# Patient Record
Sex: FEMALE | Race: WHITE | Employment: UNEMPLOYED | URBAN - NONMETROPOLITAN AREA
[De-identification: names, ages, dates, MRNs, and addresses within clinical notes are randomized per-mention and may not be internally consistent; named-entity substitution may affect disease eponyms.]

---

## 2022-04-05 ENCOUNTER — HOSPITAL ENCOUNTER (OUTPATIENT)
Dept: ULTRASOUND IMAGING | Age: 52
Discharge: HOME | End: 2022-04-05
Payer: COMMERCIAL

## 2022-04-05 DIAGNOSIS — R10.2 PELVIC PAIN IN FEMALE: ICD-10-CM

## 2022-04-05 DIAGNOSIS — N89.8 VAGINAL DISCHARGE: ICD-10-CM

## 2022-04-05 PROCEDURE — 76856 US EXAM PELVIC COMPLETE: CPT

## 2022-04-05 PROCEDURE — 93975 VASCULAR STUDY: CPT

## 2022-04-05 PROCEDURE — 76830 TRANSVAGINAL US NON-OB: CPT

## 2023-07-30 ENCOUNTER — HOSPITAL ENCOUNTER (EMERGENCY)
Age: 53
Discharge: HOME | End: 2023-07-30
Attending: EMERGENCY MEDICINE
Payer: COMMERCIAL

## 2023-07-30 ENCOUNTER — APPOINTMENT (OUTPATIENT)
Dept: CT IMAGING | Age: 53
End: 2023-07-30
Payer: COMMERCIAL

## 2023-07-30 ENCOUNTER — APPOINTMENT (OUTPATIENT)
Dept: CT IMAGING | Age: 53
End: 2023-07-30
Attending: EMERGENCY MEDICINE
Payer: COMMERCIAL

## 2023-07-30 VITALS
TEMPERATURE: 98 F | WEIGHT: 125 LBS | SYSTOLIC BLOOD PRESSURE: 101 MMHG | DIASTOLIC BLOOD PRESSURE: 66 MMHG | HEART RATE: 66 BPM | RESPIRATION RATE: 18 BRPM | OXYGEN SATURATION: 100 % | BODY MASS INDEX: 20.83 KG/M2 | HEIGHT: 65 IN

## 2023-07-30 DIAGNOSIS — R07.89 RIGHT-SIDED CHEST WALL PAIN: ICD-10-CM

## 2023-07-30 DIAGNOSIS — T07.XXXA ABRASIONS OF MULTIPLE SITES: ICD-10-CM
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
## 2023-07-30 DIAGNOSIS — S09.90XA CLOSED HEAD INJURY, INITIAL ENCOUNTER: ICD-10-CM

## 2023-07-30 PROCEDURE — 99284 EMERGENCY DEPT VISIT MOD MDM: CPT

## 2023-07-30 PROCEDURE — 70450 CT HEAD/BRAIN W/O DYE: CPT

## 2023-07-30 PROCEDURE — 6370000000 HC RX 637 (ALT 250 FOR IP): Performed by: EMERGENCY MEDICINE

## 2023-07-30 PROCEDURE — 71250 CT THORAX DX C-: CPT

## 2023-07-30 PROCEDURE — 6360000002 HC RX W HCPCS: Performed by: EMERGENCY MEDICINE

## 2023-07-30 PROCEDURE — 90471 IMMUNIZATION ADMIN: CPT | Performed by: EMERGENCY MEDICINE

## 2023-07-30 PROCEDURE — 90715 TDAP VACCINE 7 YRS/> IM: CPT | Performed by: EMERGENCY MEDICINE

## 2023-07-30 RX ORDER — BUPROPION HYDROCHLORIDE 100 MG/1
TABLET, EXTENDED RELEASE ORAL
COMMUNITY
Start: 2023-05-07

## 2023-07-30 RX ORDER — BACITRACIN ZINC 500 [USP'U]/G
OINTMENT TOPICAL ONCE
Status: COMPLETED | OUTPATIENT
Start: 2023-07-30 | End: 2023-07-30

## 2023-07-30 RX ORDER — METHYLPHENIDATE HYDROCHLORIDE 18 MG/1
18 TABLET ORAL DAILY
COMMUNITY
Start: 2023-06-15

## 2023-07-30 RX ADMIN — TETANUS TOXOID, REDUCED DIPHTHERIA TOXOID AND ACELLULAR PERTUSSIS VACCINE, ADSORBED 0.5 ML: 5; 2.5; 8; 8; 2.5 SUSPENSION INTRAMUSCULAR at 11:12

## 2023-07-30 RX ADMIN — BACITRACIN ZINC: 500 OINTMENT TOPICAL at 11:12

## 2023-07-30 ASSESSMENT — PAIN DESCRIPTION - ORIENTATION: ORIENTATION: RIGHT

## 2023-07-30 ASSESSMENT — PAIN SCALES - GENERAL: PAINLEVEL_OUTOF10: 3
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
## 2023-07-30 NOTE — DISCHARGE INSTRUCTIONS
Follow-up with primary care physician for reevaluation. Call for an appointment  Take Tylenol alternate with Motrin for any pain aches and fevers  Rest, ice, elevation to affected area  Return to the emergency department immediately any increased pain or any pus drainage discharge signs infection or worsening symptoms.

## 2023-07-30 NOTE — ED NOTES
Dr Beatrice Swift in to discuss test results and plan for discharge.      Josemanuel Monroy RN  07/30/23 [id number]

## 2023-07-30 NOTE — ED NOTES
Discharge instructions reviewed with pt and verbalizes understanding.      Mini Ontiveros RN  07/30/23 [id number]

## 2023-09-27 LAB
C REACTIVE PROTEIN (MG/L) IN SER/PLAS: 0.1 MG/DL
SEDIMENTATION RATE, ERYTHROCYTE: >130 MM/H (ref 0–30)
THYROTROPIN (MIU/L) IN SER/PLAS BY DETECTION LIMIT <= 0.05 MIU/L: 1.94 MIU/L (ref 0.44–3.98)

## 2023-09-28 LAB — TISSUE TRANSGLUTAMINASE, IGA: <1 U/ML (ref 0–14)

## 2023-10-09 ENCOUNTER — LAB (OUTPATIENT)
Dept: LAB | Facility: LAB | Age: 53
End: 2023-10-09

## 2023-10-09 DIAGNOSIS — R19.7 DIARRHEA, UNSPECIFIED TYPE: Primary | ICD-10-CM

## 2023-10-09 DIAGNOSIS — R19.7 DIARRHEA, UNSPECIFIED TYPE: ICD-10-CM

## 2023-10-13 LAB — O+P STL MICRO: NEGATIVE

## 2023-11-03 ENCOUNTER — HOSPITAL ENCOUNTER (EMERGENCY)
Facility: HOSPITAL | Age: 53
Discharge: HOME | End: 2023-11-03
Attending: EMERGENCY MEDICINE
Payer: MEDICARE

## 2023-11-03 ENCOUNTER — APPOINTMENT (OUTPATIENT)
Dept: RADIOLOGY | Facility: HOSPITAL | Age: 53
End: 2023-11-03
Payer: MEDICARE

## 2023-11-03 VITALS
RESPIRATION RATE: 18 BRPM | SYSTOLIC BLOOD PRESSURE: 110 MMHG | BODY MASS INDEX: 19.98 KG/M2 | WEIGHT: 119.93 LBS | OXYGEN SATURATION: 100 % | TEMPERATURE: 98.2 F | HEIGHT: 65 IN | DIASTOLIC BLOOD PRESSURE: 60 MMHG | HEART RATE: 70 BPM

## 2023-11-03 DIAGNOSIS — R51.9 ACUTE NONINTRACTABLE HEADACHE, UNSPECIFIED HEADACHE TYPE: Primary | ICD-10-CM

## 2023-11-03 LAB
ANION GAP SERPL CALC-SCNC: 10 MMOL/L (ref 10–20)
APPEARANCE UR: ABNORMAL
BASOPHILS # BLD AUTO: 0.04 X10*3/UL (ref 0–0.1)
BASOPHILS NFR BLD AUTO: 0.6 %
BILIRUB UR STRIP.AUTO-MCNC: NEGATIVE MG/DL
BUN SERPL-MCNC: 21 MG/DL (ref 6–23)
CALCIUM SERPL-MCNC: 9.5 MG/DL (ref 8.6–10.3)
CHLORIDE SERPL-SCNC: 103 MMOL/L (ref 98–107)
CO2 SERPL-SCNC: 28 MMOL/L (ref 21–32)
COLOR UR: YELLOW
CREAT SERPL-MCNC: 0.92 MG/DL (ref 0.5–1.05)
EOSINOPHIL # BLD AUTO: 0.32 X10*3/UL (ref 0–0.7)
EOSINOPHIL NFR BLD AUTO: 5.1 %
ERYTHROCYTE [DISTWIDTH] IN BLOOD BY AUTOMATED COUNT: 12.8 % (ref 11.5–14.5)
GFR SERPL CREATININE-BSD FRML MDRD: 75 ML/MIN/1.73M*2
GLUCOSE SERPL-MCNC: 93 MG/DL (ref 74–99)
GLUCOSE UR STRIP.AUTO-MCNC: NEGATIVE MG/DL
HCT VFR BLD AUTO: 36 % (ref 36–46)
HGB BLD-MCNC: 12.6 G/DL (ref 12–16)
HOLD SPECIMEN: NORMAL
IMM GRANULOCYTES # BLD AUTO: 0.01 X10*3/UL (ref 0–0.7)
IMM GRANULOCYTES NFR BLD AUTO: 0.2 % (ref 0–0.9)
KETONES UR STRIP.AUTO-MCNC: NEGATIVE MG/DL
LEUKOCYTE ESTERASE UR QL STRIP.AUTO: NEGATIVE
LYMPHOCYTES # BLD AUTO: 1.54 X10*3/UL (ref 1.2–4.8)
LYMPHOCYTES NFR BLD AUTO: 24.6 %
MCH RBC QN AUTO: 31.2 PG (ref 26–34)
MCHC RBC AUTO-ENTMCNC: 35 G/DL (ref 32–36)
MCV RBC AUTO: 89 FL (ref 80–100)
MONOCYTES # BLD AUTO: 0.5 X10*3/UL (ref 0.1–1)
MONOCYTES NFR BLD AUTO: 8 %
NEUTROPHILS # BLD AUTO: 3.86 X10*3/UL (ref 1.2–7.7)
NEUTROPHILS NFR BLD AUTO: 61.5 %
NITRITE UR QL STRIP.AUTO: NEGATIVE
NRBC BLD-RTO: 0 /100 WBCS (ref 0–0)
PH UR STRIP.AUTO: 7 [PH]
PLATELET # BLD AUTO: 262 X10*3/UL (ref 150–450)
POTASSIUM SERPL-SCNC: 3.9 MMOL/L (ref 3.5–5.3)
PROT UR STRIP.AUTO-MCNC: NEGATIVE MG/DL
RBC # BLD AUTO: 4.04 X10*6/UL (ref 4–5.2)
RBC # UR STRIP.AUTO: NEGATIVE /UL
SODIUM SERPL-SCNC: 137 MMOL/L (ref 136–145)
SP GR UR STRIP.AUTO: 1.01
TSH SERPL-ACNC: 2.06 MIU/L (ref 0.44–3.98)
UROBILINOGEN UR STRIP.AUTO-MCNC: <2 MG/DL
WBC # BLD AUTO: 6.3 X10*3/UL (ref 4.4–11.3)

## 2023-11-03 PROCEDURE — 80048 BASIC METABOLIC PNL TOTAL CA: CPT | Performed by: EMERGENCY MEDICINE

## 2023-11-03 PROCEDURE — 84443 ASSAY THYROID STIM HORMONE: CPT | Performed by: EMERGENCY MEDICINE

## 2023-11-03 PROCEDURE — 70450 CT HEAD/BRAIN W/O DYE: CPT

## 2023-11-03 PROCEDURE — 96375 TX/PRO/DX INJ NEW DRUG ADDON: CPT

## 2023-11-03 PROCEDURE — 85025 COMPLETE CBC W/AUTO DIFF WBC: CPT | Performed by: EMERGENCY MEDICINE

## 2023-11-03 PROCEDURE — 96374 THER/PROPH/DIAG INJ IV PUSH: CPT

## 2023-11-03 PROCEDURE — 96361 HYDRATE IV INFUSION ADD-ON: CPT

## 2023-11-03 PROCEDURE — 70450 CT HEAD/BRAIN W/O DYE: CPT | Performed by: RADIOLOGY

## 2023-11-03 PROCEDURE — 36415 COLL VENOUS BLD VENIPUNCTURE: CPT | Performed by: EMERGENCY MEDICINE

## 2023-11-03 PROCEDURE — 99284 EMERGENCY DEPT VISIT MOD MDM: CPT | Mod: 25 | Performed by: EMERGENCY MEDICINE

## 2023-11-03 PROCEDURE — 81003 URINALYSIS AUTO W/O SCOPE: CPT | Performed by: EMERGENCY MEDICINE

## 2023-11-03 PROCEDURE — 2500000004 HC RX 250 GENERAL PHARMACY W/ HCPCS (ALT 636 FOR OP/ED): Performed by: EMERGENCY MEDICINE

## 2023-11-03 RX ORDER — ACETAMINOPHEN 325 MG/1
975 TABLET ORAL ONCE
Status: COMPLETED | OUTPATIENT
Start: 2023-11-03 | End: 2023-11-03

## 2023-11-03 RX ORDER — PROCHLORPERAZINE EDISYLATE 5 MG/ML
10 INJECTION INTRAMUSCULAR; INTRAVENOUS ONCE
Status: COMPLETED | OUTPATIENT
Start: 2023-11-03 | End: 2023-11-03

## 2023-11-03 RX ORDER — KETOROLAC TROMETHAMINE 30 MG/ML
15 INJECTION, SOLUTION INTRAMUSCULAR; INTRAVENOUS ONCE
Status: COMPLETED | OUTPATIENT
Start: 2023-11-03 | End: 2023-11-03

## 2023-11-03 RX ADMIN — ACETAMINOPHEN 975 MG: 325 TABLET ORAL at 17:04

## 2023-11-03 RX ADMIN — KETOROLAC TROMETHAMINE 15 MG: 30 INJECTION, SOLUTION INTRAMUSCULAR; INTRAVENOUS at 17:03

## 2023-11-03 RX ADMIN — PROCHLORPERAZINE EDISYLATE 10 MG: 5 INJECTION INTRAMUSCULAR; INTRAVENOUS at 17:03

## 2023-11-03 RX ADMIN — SODIUM CHLORIDE, POTASSIUM CHLORIDE, SODIUM LACTATE AND CALCIUM CHLORIDE 1000 ML: 600; 310; 30; 20 INJECTION, SOLUTION INTRAVENOUS at 17:03

## 2023-11-03 ASSESSMENT — PAIN SCALES - GENERAL: PAINLEVEL_OUTOF10: 7

## 2023-11-03 ASSESSMENT — PAIN DESCRIPTION - DESCRIPTORS: DESCRIPTORS: PRESSURE

## 2023-11-03 ASSESSMENT — PAIN DESCRIPTION - LOCATION: LOCATION: HEAD

## 2023-11-03 ASSESSMENT — PAIN DESCRIPTION - ONSET: ONSET: PROGRESSIVE

## 2023-11-03 ASSESSMENT — PAIN DESCRIPTION - FREQUENCY: FREQUENCY: CONSTANT/CONTINUOUS

## 2023-11-03 ASSESSMENT — PAIN - FUNCTIONAL ASSESSMENT: PAIN_FUNCTIONAL_ASSESSMENT: 0-10

## 2023-11-03 ASSESSMENT — PAIN DESCRIPTION - PAIN TYPE: TYPE: CHRONIC PAIN;OTHER (COMMENT)

## 2023-11-03 NOTE — ED PROVIDER NOTES
HPI   Chief Complaint   Patient presents with    pressure in head x 2 weeks/ blurry vision resolved        The patient is a 53-year-old female presenting with headache, blurred vision over the last 2 weeks.  States she developed a mild headache 2 weeks ago that progressively worsened since onset.  Did not begin with exertion.  Was associated with mild blurred vision which has since resolved.  Has not been taking any medications for symptoms at home.  Denies any numbness or weakness in any of her extremities since symptom onset.  Denies any inciting head trauma.  Denies any vomiting.  Denies any difficulty with ambulation.  Denies any other recent illness or injury.                          No data recorded                Patient History   No past medical history on file.  Past Surgical History:   Procedure Laterality Date    TONSILLECTOMY  04/12/2018    Tonsillectomy     No family history on file.  Social History     Tobacco Use    Smoking status: Not on file    Smokeless tobacco: Not on file   Substance Use Topics    Alcohol use: Not on file    Drug use: Not on file       Physical Exam   ED Triage Vitals [11/03/23 1523]   Temp Heart Rate Resp BP   36.8 °C (98.2 °F) 74 18 105/57      SpO2 Temp Source Heart Rate Source Patient Position   99 % Temporal Monitor Sitting      BP Location FiO2 (%)     Left arm --       Physical Exam  Vitals and nursing note reviewed.   Constitutional:       General: She is not in acute distress.     Appearance: Normal appearance. She is not ill-appearing or toxic-appearing.   HENT:      Head: Normocephalic and atraumatic.      Nose: No congestion or rhinorrhea.      Mouth/Throat:      Mouth: Mucous membranes are moist.      Pharynx: Oropharynx is clear. No oropharyngeal exudate or posterior oropharyngeal erythema.   Eyes:      Extraocular Movements: Extraocular movements intact.      Right eye: Normal extraocular motion.      Left eye: Normal extraocular motion.      Conjunctiva/sclera:  Conjunctivae normal.      Pupils: Pupils are equal, round, and reactive to light.   Cardiovascular:      Rate and Rhythm: Normal rate and regular rhythm.      Pulses: Normal pulses.      Heart sounds: Normal heart sounds, S1 normal and S2 normal. No murmur heard.     No friction rub. No gallop.   Pulmonary:      Effort: Pulmonary effort is normal. No respiratory distress.      Breath sounds: Normal breath sounds. No stridor. No wheezing, rhonchi or rales.   Abdominal:      General: Abdomen is flat. Bowel sounds are normal. There is no distension.      Palpations: Abdomen is soft.      Tenderness: There is no abdominal tenderness. There is no right CVA tenderness, left CVA tenderness, guarding or rebound.   Musculoskeletal:      Cervical back: Full passive range of motion without pain.      Right lower leg: No edema.      Left lower leg: No edema.   Skin:     General: Skin is warm and dry.   Neurological:      General: No focal deficit present.      Mental Status: She is alert and oriented to person, place, and time.      GCS: GCS eye subscore is 4. GCS verbal subscore is 5. GCS motor subscore is 6.      Cranial Nerves: No cranial nerve deficit.      Sensory: No sensory deficit.      Motor: No weakness, tremor or abnormal muscle tone.      Coordination: Coordination is intact.      Gait: Gait is intact.      Deep Tendon Reflexes:      Reflex Scores:       Patellar reflexes are 2+ on the right side and 2+ on the left side.  Psychiatric:         Mood and Affect: Mood normal.         ED Course & [facility]   ED Course as of 11/07/23 2008 Fri Nov 03, 2023 2055 CT head showing no acute intracranial abnormalities.  Urinalysis unremarkable for evidence of infection.  CMP/cell count unremarkable.  Thyroid function testing normal. [BR]   2055 Patient's labs and imaging unremarkable, on reassessment she feels better after IV fluids, Compazine, Toradol.  Continues to be afebrile and very well-appearing in the emergency department,  has no nuchal rigidity to suggest CNS infectious process and remains awake, alert, oriented with no focal neurological deficit.  Given patient's reassuring exam, vitals, imaging, labs, I feel she is appropriate for discharge.  I did discuss following up closely with her primary care physician as well as returning to the emergency department for any recurrent/worsening symptoms, patient verbally conveys understanding to these instructions.  Patient discharged in good condition. [BR]      ED Course User Index  [BR] Ander Schmitz MD         Diagnoses as of 11/07/23 2008   Acute nonintractable headache, unspecified headache type       Medical Decision Making  Patient presenting with headache, blurred vision (now resolved) over the last 2 weeks.  Headache was not abrupt in onset nor maximal at onset suggesting against subarachnoid hemorrhage.  On examination patient without focal neurological deficit to suggest CVA.  Patient without nuchal rigidity or other meningeal findings to suggest infectious pathology.  Suspect primary headache, given persistence of pain will obtain labs and CT imaging to assess for intracranial mass, bleed.  We will treat with IV Toradol, Compazine, fluids for symptom control.  Disposition pending labs, imaging results.        Procedure  Procedures     Ander Schmitz MD  11/07/23 2013

## 2023-11-05 PROBLEM — M25.551 PAIN OF RIGHT HIP JOINT: Status: ACTIVE | Noted: 2019-03-18

## 2023-11-05 PROBLEM — G47.51 CONFUSIONAL AROUSALS: Status: ACTIVE | Noted: 2023-11-05

## 2023-11-05 PROBLEM — R27.0 ATAXIA: Status: ACTIVE | Noted: 2023-08-22

## 2023-11-05 PROBLEM — R53.83 MALAISE AND FATIGUE: Status: ACTIVE | Noted: 2023-01-17

## 2023-11-05 PROBLEM — E05.90 HYPERTHYROIDISM: Status: ACTIVE | Noted: 2023-08-22

## 2023-11-05 PROBLEM — M25.749 METACARPAL BOSS: Status: ACTIVE | Noted: 2019-01-23

## 2023-11-05 PROBLEM — R15.9 INCONTINENCE OF FECES: Status: ACTIVE | Noted: 2023-11-05

## 2023-11-05 PROBLEM — R10.13 EPIGASTRIC PAIN: Status: ACTIVE | Noted: 2023-04-12

## 2023-11-05 PROBLEM — A09 DIARRHEA OF INFECTIOUS ORIGIN: Status: ACTIVE | Noted: 2023-11-05

## 2023-11-05 PROBLEM — R82.90 ABNORMAL URINALYSIS: Status: ACTIVE | Noted: 2019-09-16

## 2023-11-05 PROBLEM — R29.6 FREQUENT FALLS: Status: ACTIVE | Noted: 2023-08-22

## 2023-11-05 PROBLEM — S39.011D: Status: ACTIVE | Noted: 2023-11-05

## 2023-11-05 PROBLEM — R19.7 DIARRHEA: Status: ACTIVE | Noted: 2023-10-09

## 2023-11-05 PROBLEM — M79.7 FIBROMYALGIA: Status: ACTIVE | Noted: 2023-11-05

## 2023-11-05 PROBLEM — G47.33 OSA (OBSTRUCTIVE SLEEP APNEA): Status: ACTIVE | Noted: 2023-11-05

## 2023-11-05 PROBLEM — F43.21 GRIEF: Status: ACTIVE | Noted: 2023-11-05

## 2023-11-05 PROBLEM — F41.1 GENERALIZED ANXIETY DISORDER: Status: ACTIVE | Noted: 2021-08-30

## 2023-11-05 PROBLEM — R35.0 URINE FREQUENCY: Status: ACTIVE | Noted: 2023-11-05

## 2023-11-05 PROBLEM — Z59.6 LOW INCOME: Status: ACTIVE | Noted: 2021-08-30

## 2023-11-05 PROBLEM — N95.1 MENOPAUSAL SYMPTOMS: Status: ACTIVE | Noted: 2023-11-05

## 2023-11-05 PROBLEM — H53.9 VISUAL DISTURBANCE: Status: ACTIVE | Noted: 2020-04-03

## 2023-11-05 PROBLEM — M99.08 SOMATIC DYSFUNCTION OF RIB: Status: ACTIVE | Noted: 2023-11-05

## 2023-11-05 PROBLEM — F43.10 PTSD (POST-TRAUMATIC STRESS DISORDER): Status: ACTIVE | Noted: 2023-11-05

## 2023-11-05 PROBLEM — F33.9 RECURRENT MAJOR DEPRESSIVE DISORDER (CMS-HCC): Status: ACTIVE | Noted: 2019-09-13

## 2023-11-05 PROBLEM — E44.0 MALNUTRITION OF MODERATE DEGREE (MULTI): Status: ACTIVE | Noted: 2019-09-14

## 2023-11-05 PROBLEM — M99.09 SEGMENTAL AND SOMATIC DYSFUNCTION: Status: ACTIVE | Noted: 2023-11-05

## 2023-11-05 PROBLEM — F51.02 ADJUSTMENT INSOMNIA: Status: ACTIVE | Noted: 2023-11-05

## 2023-11-05 PROBLEM — F33.3 SEVERE EPISODE OF RECURRENT MAJOR DEPRESSIVE DISORDER, WITH PSYCHOTIC FEATURES (MULTI): Status: ACTIVE | Noted: 2021-05-26

## 2023-11-05 PROBLEM — K62.5 RECTAL BLEEDING: Status: ACTIVE | Noted: 2023-08-22

## 2023-11-05 PROBLEM — M67.439 DORSAL WRIST GANGLION: Status: ACTIVE | Noted: 2019-01-23

## 2023-11-05 PROBLEM — Z20.822 SUSPECTED COVID-19 VIRUS INFECTION: Status: ACTIVE | Noted: 2023-08-22

## 2023-11-05 PROBLEM — R26.89 IMPAIRMENT OF BALANCE: Status: ACTIVE | Noted: 2023-07-21

## 2023-11-05 PROBLEM — Z86.73 PRSNL HX OF TIA (TIA), AND CEREB INFRC W/O RESID DEFICITS: Status: ACTIVE | Noted: 2023-07-21

## 2023-11-05 PROBLEM — I63.9 CEREBRAL INFARCTION (MULTI): Status: ACTIVE | Noted: 2023-03-30

## 2023-11-05 PROBLEM — F31.9 BIPOLAR DEPRESSION (MULTI): Status: ACTIVE | Noted: 2023-11-05

## 2023-11-05 PROBLEM — Z62.811 PERSONAL HISTORY OF PSYCHOLOGICAL ABUSE IN CHILDHOOD: Status: ACTIVE | Noted: 2021-08-30

## 2023-11-05 PROBLEM — R53.1 WEAKNESS: Status: ACTIVE | Noted: 2023-08-22

## 2023-11-05 PROBLEM — E87.6 HYPOKALEMIA: Status: ACTIVE | Noted: 2019-09-16

## 2023-11-05 PROBLEM — R19.4 CHANGE IN BOWEL HABITS: Status: ACTIVE | Noted: 2023-11-05

## 2023-11-05 PROBLEM — F45.21 ILLNESS ANXIETY DISORDER: Status: ACTIVE | Noted: 2023-11-05

## 2023-11-05 PROBLEM — F40.00 AGORAPHOBIA: Status: ACTIVE | Noted: 2021-08-30

## 2023-11-05 PROBLEM — R41.89 COGNITIVE IMPAIRMENT: Status: ACTIVE | Noted: 2023-08-22

## 2023-11-05 PROBLEM — F50.81 BINGE-EATING DISORDER, MODERATE: Status: ACTIVE | Noted: 2021-08-30

## 2023-11-05 PROBLEM — I63.9 OCCIPITAL STROKE (MULTI): Status: ACTIVE | Noted: 2023-08-22

## 2023-11-05 PROBLEM — K04.6: Status: ACTIVE | Noted: 2023-08-22

## 2023-11-05 PROBLEM — R41.3 SHORT-TERM MEMORY LOSS: Status: ACTIVE | Noted: 2023-08-22

## 2023-11-05 PROBLEM — R29.818 PARKINSONIAN FEATURES: Status: ACTIVE | Noted: 2023-08-22

## 2023-11-05 PROBLEM — F50.811 BINGE-EATING DISORDER, MODERATE: Status: ACTIVE | Noted: 2021-08-30

## 2023-11-05 PROBLEM — G47.00 INSOMNIA: Status: ACTIVE | Noted: 2023-11-05

## 2023-11-05 PROBLEM — R41.89 SUBJECTIVE MEMORY COMPLAINTS: Status: ACTIVE | Noted: 2023-01-09

## 2023-11-05 PROBLEM — F41.0 PANIC DISORDER: Status: ACTIVE | Noted: 2021-08-30

## 2023-11-05 PROBLEM — K29.30 CHRONIC SUPERFICIAL GASTRITIS WITHOUT BLEEDING: Status: ACTIVE | Noted: 2023-11-05

## 2023-11-05 PROBLEM — G45.9 TIA (TRANSIENT ISCHEMIC ATTACK): Status: ACTIVE | Noted: 2018-06-30

## 2023-11-05 PROBLEM — R41.3 MEMORY LOSS: Status: ACTIVE | Noted: 2019-09-23

## 2023-11-05 PROBLEM — N95.0 POSTMENOPAUSAL BLEEDING: Status: ACTIVE | Noted: 2018-08-13

## 2023-11-05 PROBLEM — F90.9 ATTENTION DEFICIT HYPERACTIVITY DISORDER (ADHD): Status: ACTIVE | Noted: 2023-08-22

## 2023-11-05 PROBLEM — R53.81 MALAISE AND FATIGUE: Status: ACTIVE | Noted: 2023-01-17

## 2023-11-05 PROBLEM — F41.8 DEPRESSION WITH ANXIETY: Status: ACTIVE | Noted: 2023-11-05

## 2023-11-05 PROBLEM — R63.4 WEIGHT LOSS: Status: ACTIVE | Noted: 2023-04-12

## 2023-11-05 PROBLEM — N64.4 BREAST TENDERNESS: Status: ACTIVE | Noted: 2023-11-05

## 2023-11-05 PROBLEM — F34.1 PERSISTENT DEPRESSIVE DISORDER: Status: ACTIVE | Noted: 2021-08-30

## 2023-11-05 RX ORDER — METFORMIN HYDROCHLORIDE 500 MG/1
1 TABLET ORAL DAILY
COMMUNITY
Start: 2021-08-18 | End: 2023-11-07 | Stop reason: ALTCHOICE

## 2023-11-05 RX ORDER — METHYLPHENIDATE HYDROCHLORIDE 18 MG/1
18 TABLET ORAL DAILY
COMMUNITY
Start: 2022-03-24 | End: 2023-11-07 | Stop reason: ALTCHOICE

## 2023-11-05 RX ORDER — KETAMINE HCL IN NACL, ISO-OSM 100MG/10ML
SYRINGE (ML) INJECTION
COMMUNITY
End: 2023-11-07 | Stop reason: ALTCHOICE

## 2023-11-05 RX ORDER — QUETIAPINE 150 MG/1
TABLET, FILM COATED, EXTENDED RELEASE ORAL
COMMUNITY
End: 2023-11-07 | Stop reason: WASHOUT

## 2023-11-05 RX ORDER — ATOMOXETINE 18 MG/1
18 CAPSULE ORAL EVERY 12 HOURS
COMMUNITY
Start: 2022-04-14 | End: 2023-11-07 | Stop reason: ALTCHOICE

## 2023-11-05 RX ORDER — IBUPROFEN 800 MG/1
1 TABLET ORAL
COMMUNITY

## 2023-11-05 RX ORDER — ZOLPIDEM TARTRATE 10 MG/1
TABLET ORAL
COMMUNITY
End: 2023-11-07 | Stop reason: WASHOUT

## 2023-11-05 RX ORDER — NAPROXEN SODIUM 220 MG/1
TABLET ORAL
COMMUNITY

## 2023-11-05 RX ORDER — ASPIRIN 325 MG
TABLET ORAL
COMMUNITY
End: 2023-11-07 | Stop reason: ALTCHOICE

## 2023-11-05 RX ORDER — CLOMIPRAMINE HYDROCHLORIDE 25 MG/1
1 CAPSULE ORAL NIGHTLY
COMMUNITY
End: 2023-11-07 | Stop reason: ALTCHOICE

## 2023-11-05 RX ORDER — DULOXETIN HYDROCHLORIDE 30 MG/1
CAPSULE, DELAYED RELEASE ORAL
COMMUNITY
Start: 2020-02-18 | End: 2023-11-07 | Stop reason: ALTCHOICE

## 2023-11-05 RX ORDER — CLONAZEPAM 1 MG/1
1 TABLET ORAL DAILY
COMMUNITY
Start: 2021-04-22

## 2023-11-05 RX ORDER — ASPIRIN 81 MG/1
81 TABLET ORAL DAILY
COMMUNITY

## 2023-11-05 RX ORDER — SELEGILINE 6 MG/24H
PATCH TRANSDERMAL
COMMUNITY
End: 2023-11-07 | Stop reason: WASHOUT

## 2023-11-05 RX ORDER — VENLAFAXINE HYDROCHLORIDE 150 MG/1
150 CAPSULE, EXTENDED RELEASE ORAL DAILY
COMMUNITY
Start: 2020-10-07 | End: 2023-11-07 | Stop reason: WASHOUT

## 2023-11-05 RX ORDER — ESKETAMINE HYDROCHLORIDE 28 MG/.2ML
SOLUTION NASAL
COMMUNITY
Start: 2021-06-29 | End: 2023-11-07 | Stop reason: ALTCHOICE

## 2023-11-05 RX ORDER — TRAZODONE HYDROCHLORIDE 100 MG/1
200 TABLET ORAL EVERY MORNING
COMMUNITY
End: 2023-11-07 | Stop reason: WASHOUT

## 2023-11-05 RX ORDER — CHOLECALCIFEROL (VITAMIN D3) 25 MCG
1 TABLET ORAL DAILY
COMMUNITY

## 2023-11-05 RX ORDER — LIOTHYRONINE SODIUM 5 UG/1
5 TABLET ORAL DAILY
COMMUNITY
Start: 2021-03-04 | End: 2023-11-07 | Stop reason: ALTCHOICE

## 2023-11-05 RX ORDER — DEXTROMETHORPHAN HBR 15 MG/1
3 CAPSULE, LIQUID FILLED ORAL DAILY
COMMUNITY
Start: 2023-03-23 | End: 2023-11-07 | Stop reason: ALTCHOICE

## 2023-11-05 RX ORDER — ASPIRIN 325 MG
TABLET ORAL
COMMUNITY

## 2023-11-05 RX ORDER — LITHIUM CARBONATE 150 MG/1
CAPSULE ORAL
COMMUNITY
Start: 2021-04-29 | End: 2023-11-07 | Stop reason: ALTCHOICE

## 2023-11-05 RX ORDER — PROPRANOLOL HYDROCHLORIDE 10 MG/1
TABLET ORAL
COMMUNITY
Start: 2021-01-27 | End: 2023-11-07 | Stop reason: WASHOUT

## 2023-11-05 RX ORDER — VITAMIN B COMPLEX
CAPSULE ORAL
COMMUNITY

## 2023-11-05 RX ORDER — FLUTICASONE PROPIONATE 50 MCG
SPRAY, SUSPENSION (ML) NASAL
COMMUNITY
End: 2023-11-07 | Stop reason: ALTCHOICE

## 2023-11-05 RX ORDER — MODAFINIL 100 MG/1
100 TABLET ORAL DAILY
COMMUNITY
Start: 2023-02-08 | End: 2023-11-07 | Stop reason: ALTCHOICE

## 2023-11-05 RX ORDER — QUETIAPINE FUMARATE 50 MG/1
TABLET, EXTENDED RELEASE ORAL
COMMUNITY
Start: 2023-01-06 | End: 2023-11-07 | Stop reason: WASHOUT

## 2023-11-05 RX ORDER — CLOMIPRAMINE HYDROCHLORIDE 50 MG/1
2 CAPSULE ORAL NIGHTLY
COMMUNITY
End: 2023-11-07 | Stop reason: ALTCHOICE

## 2023-11-05 RX ORDER — MIRTAZAPINE 45 MG/1
1 TABLET, FILM COATED ORAL NIGHTLY
COMMUNITY
Start: 2021-12-02 | End: 2023-11-07 | Stop reason: ALTCHOICE

## 2023-11-05 RX ORDER — HYDROXYZINE HYDROCHLORIDE 25 MG/1
25 TABLET, FILM COATED ORAL EVERY 8 HOURS PRN
COMMUNITY
Start: 2023-05-01 | End: 2023-11-07 | Stop reason: ALTCHOICE

## 2023-11-05 RX ORDER — PROGESTERONE 100 MG/1
100 CAPSULE ORAL DAILY
COMMUNITY
Start: 2021-12-29 | End: 2023-11-07 | Stop reason: WASHOUT

## 2023-11-05 RX ORDER — ESTRADIOL 0.5 MG/1
1 TABLET ORAL DAILY
COMMUNITY
Start: 2019-04-08 | End: 2023-11-07 | Stop reason: ALTCHOICE

## 2023-11-05 RX ORDER — PANTOPRAZOLE SODIUM 40 MG/1
40 TABLET, DELAYED RELEASE ORAL DAILY
COMMUNITY
Start: 2019-11-12 | End: 2023-11-07 | Stop reason: WASHOUT

## 2023-11-05 RX ORDER — PROPRANOLOL HYDROCHLORIDE 20 MG/1
1.5 TABLET ORAL 3 TIMES DAILY
COMMUNITY
End: 2023-11-07 | Stop reason: WASHOUT

## 2023-11-05 RX ORDER — BREXPIPRAZOLE 0.25 MG/1
1 TABLET ORAL DAILY
COMMUNITY
Start: 2023-01-06 | End: 2023-11-07 | Stop reason: WASHOUT

## 2023-11-05 RX ORDER — ESZOPICLONE 2 MG/1
2 TABLET, FILM COATED ORAL NIGHTLY PRN
COMMUNITY
Start: 2023-07-07 | End: 2023-11-07 | Stop reason: ALTCHOICE

## 2023-11-05 RX ORDER — CYCLOSERINE 250 1/1
1 CAPSULE ORAL DAILY
COMMUNITY
Start: 2022-05-27 | End: 2023-11-07 | Stop reason: ALTCHOICE

## 2023-11-05 RX ORDER — NORTRIPTYLINE HYDROCHLORIDE 50 MG/1
50 CAPSULE ORAL DAILY
COMMUNITY
Start: 2022-07-14 | End: 2023-11-07 | Stop reason: ALTCHOICE

## 2023-11-05 RX ORDER — ESTRADIOL/NORETHINDRONE ACETATE TRANSDERMAL SYSTEM .05; .14 MG/D; MG/D
PATCH, EXTENDED RELEASE TRANSDERMAL
COMMUNITY
Start: 2023-10-06 | End: 2023-11-07 | Stop reason: ALTCHOICE

## 2023-11-05 RX ORDER — MULTIVITAMIN
0.5 TABLET ORAL DAILY
COMMUNITY
Start: 2013-11-01 | End: 2023-11-07 | Stop reason: ALTCHOICE

## 2023-11-05 RX ORDER — ASCORBIC ACID 125 MG
TABLET,CHEWABLE ORAL
COMMUNITY

## 2023-11-05 RX ORDER — ZIPRASIDONE HYDROCHLORIDE 20 MG/1
20 CAPSULE ORAL DAILY
COMMUNITY
Start: 2023-08-02 | End: 2023-11-07 | Stop reason: WASHOUT

## 2023-11-05 RX ORDER — MIRTAZAPINE 15 MG/1
15 TABLET, FILM COATED ORAL NIGHTLY
COMMUNITY
Start: 2023-09-15 | End: 2023-11-07 | Stop reason: ALTCHOICE

## 2023-11-05 RX ORDER — QUETIAPINE 200 MG/1
TABLET, FILM COATED, EXTENDED RELEASE ORAL
COMMUNITY
Start: 2022-02-23 | End: 2023-11-07 | Stop reason: WASHOUT

## 2023-11-05 RX ORDER — QUETIAPINE 300 MG/1
1 TABLET, FILM COATED, EXTENDED RELEASE ORAL NIGHTLY
COMMUNITY
End: 2023-11-07 | Stop reason: WASHOUT

## 2023-11-05 RX ORDER — DEXTROMETHORPHAN HYDROBROMIDE, BUPROPION HYDROCHLORIDE 105; 45 MG/1; MG/1
TABLET, MULTILAYER, EXTENDED RELEASE ORAL
COMMUNITY
Start: 2023-04-20

## 2023-11-05 RX ORDER — LITHIUM CARBONATE 300 MG/1
2 TABLET, FILM COATED, EXTENDED RELEASE ORAL DAILY
COMMUNITY
Start: 2021-01-06 | End: 2023-11-07 | Stop reason: ALTCHOICE

## 2023-11-05 RX ORDER — TRAZODONE HYDROCHLORIDE 100 MG/1
2 TABLET ORAL NIGHTLY
COMMUNITY
End: 2023-12-04

## 2023-11-05 RX ORDER — LITHIUM CARBONATE 450 MG/1
TABLET ORAL
COMMUNITY
Start: 2022-12-12 | End: 2023-11-07 | Stop reason: ALTCHOICE

## 2023-11-05 RX ORDER — LEVOMEFOLATE/ALGAL OIL 15-90.314
1 CAPSULE ORAL DAILY
COMMUNITY
Start: 2022-05-06 | End: 2023-11-07 | Stop reason: ALTCHOICE

## 2023-11-05 RX ORDER — LISDEXAMFETAMINE DIMESYLATE 50 MG/1
50 CAPSULE ORAL DAILY PRN
COMMUNITY
Start: 2022-05-06 | End: 2023-11-07 | Stop reason: ALTCHOICE

## 2023-11-05 RX ORDER — GABAPENTIN 600 MG/1
600 TABLET ORAL EVERY 12 HOURS
COMMUNITY
End: 2023-11-07 | Stop reason: ALTCHOICE

## 2023-11-05 RX ORDER — NORTRIPTYLINE HYDROCHLORIDE 75 MG/1
1 CAPSULE ORAL NIGHTLY
COMMUNITY
Start: 2022-07-14 | End: 2023-11-07 | Stop reason: ALTCHOICE

## 2023-11-05 RX ORDER — VILAZODONE HYDROCHLORIDE 10 MG/1
1 TABLET ORAL DAILY
COMMUNITY
End: 2023-11-07 | Stop reason: WASHOUT

## 2023-11-05 RX ORDER — DULOXETIN HYDROCHLORIDE 60 MG/1
60 CAPSULE, DELAYED RELEASE ORAL DAILY
COMMUNITY
End: 2024-01-09 | Stop reason: SDUPTHER

## 2023-11-05 RX ORDER — GUAIFENESIN AND PHENYLEPHRINE HCL 400; 10 MG/1; MG/1
2 TABLET ORAL DAILY
COMMUNITY
End: 2023-11-07 | Stop reason: WASHOUT

## 2023-11-05 RX ORDER — TRAZODONE HYDROCHLORIDE 50 MG/1
TABLET ORAL
COMMUNITY
Start: 2021-04-29 | End: 2023-11-07 | Stop reason: WASHOUT

## 2023-11-05 RX ORDER — LUMATEPERONE 42 MG/1
42 CAPSULE ORAL DAILY
COMMUNITY
End: 2023-11-07 | Stop reason: ALTCHOICE

## 2023-11-05 RX ORDER — NIACINAMIDE 500 MG
500 TABLET ORAL 2 TIMES DAILY
COMMUNITY
End: 2023-11-07 | Stop reason: ALTCHOICE

## 2023-11-05 RX ORDER — FLUPHENAZINE HYDROCHLORIDE 5 MG/1
5 TABLET ORAL NIGHTLY
COMMUNITY
Start: 2022-02-16 | End: 2023-11-07 | Stop reason: ALTCHOICE

## 2023-11-05 RX ORDER — CLONAZEPAM 0.5 MG/1
1 TABLET ORAL 2 TIMES DAILY PRN
COMMUNITY
End: 2023-11-07 | Stop reason: ALTCHOICE

## 2023-11-05 RX ORDER — PALIPERIDONE 6 MG/1
6 TABLET, EXTENDED RELEASE ORAL DAILY
COMMUNITY
Start: 2020-10-07 | End: 2023-11-07 | Stop reason: WASHOUT

## 2023-11-05 RX ORDER — ARMODAFINIL 150 MG/1
150 TABLET ORAL DAILY
COMMUNITY
End: 2023-11-07 | Stop reason: ALTCHOICE

## 2023-11-05 RX ORDER — IPRATROPIUM BROMIDE 42 UG/1
SPRAY, METERED NASAL
COMMUNITY
End: 2023-11-07 | Stop reason: ALTCHOICE

## 2023-11-05 RX ORDER — LEVOTHYROXINE SODIUM 25 UG/1
12.5 TABLET ORAL EVERY MORNING
COMMUNITY
Start: 2022-03-02 | End: 2023-11-09 | Stop reason: SDUPTHER

## 2023-11-05 RX ORDER — LEVOTHYROXINE SODIUM 25 UG/1
1 TABLET ORAL DAILY
COMMUNITY
Start: 2022-03-02 | End: 2023-11-07 | Stop reason: ALTCHOICE

## 2023-11-05 RX ORDER — PROPRANOLOL HYDROCHLORIDE 80 MG/1
80 CAPSULE, EXTENDED RELEASE ORAL DAILY
COMMUNITY
End: 2023-11-07 | Stop reason: WASHOUT

## 2023-11-05 RX ORDER — LEVOTHYROXINE SODIUM 50 UG/1
TABLET ORAL
COMMUNITY
Start: 2023-01-11 | End: 2023-12-19 | Stop reason: ALTCHOICE

## 2023-11-05 RX ORDER — VILAZODONE HYDROCHLORIDE 20 MG/1
20 TABLET ORAL DAILY
COMMUNITY
Start: 2021-03-04 | End: 2023-11-07 | Stop reason: WASHOUT

## 2023-11-05 RX ORDER — AMANTADINE HYDROCHLORIDE 100 MG/1
100 CAPSULE, GELATIN COATED ORAL EVERY 12 HOURS
COMMUNITY
Start: 2021-03-31 | End: 2023-11-07 | Stop reason: ALTCHOICE

## 2023-11-05 RX ORDER — OMEGA-3-ACID ETHYL ESTERS 1 G/1
CAPSULE, LIQUID FILLED ORAL
COMMUNITY
End: 2024-03-04 | Stop reason: ALTCHOICE

## 2023-11-07 ENCOUNTER — OFFICE VISIT (OUTPATIENT)
Dept: GASTROENTEROLOGY | Facility: CLINIC | Age: 53
End: 2023-11-07
Payer: MEDICARE

## 2023-11-07 VITALS — BODY MASS INDEX: 19.83 KG/M2 | WEIGHT: 119 LBS | HEIGHT: 65 IN

## 2023-11-07 DIAGNOSIS — E44.0 MALNUTRITION OF MODERATE DEGREE (MULTI): Primary | ICD-10-CM

## 2023-11-07 DIAGNOSIS — R63.4 WEIGHT LOSS: ICD-10-CM

## 2023-11-07 DIAGNOSIS — R68.81 EARLY SATIETY: ICD-10-CM

## 2023-11-07 PROBLEM — K62.5 RECTAL BLEEDING: Status: RESOLVED | Noted: 2023-08-22 | Resolved: 2023-11-07

## 2023-11-07 PROBLEM — R10.13 EPIGASTRIC PAIN: Status: RESOLVED | Noted: 2023-04-12 | Resolved: 2023-11-07

## 2023-11-07 PROBLEM — R19.7 DIARRHEA: Status: RESOLVED | Noted: 2023-10-09 | Resolved: 2023-11-07

## 2023-11-07 PROBLEM — R15.9 INCONTINENCE OF FECES: Status: RESOLVED | Noted: 2023-11-05 | Resolved: 2023-11-07

## 2023-11-07 PROBLEM — R19.4 CHANGE IN BOWEL HABITS: Status: RESOLVED | Noted: 2023-11-05 | Resolved: 2023-11-07

## 2023-11-07 PROCEDURE — 99214 OFFICE O/P EST MOD 30 MIN: CPT | Performed by: INTERNAL MEDICINE

## 2023-11-07 PROCEDURE — 1036F TOBACCO NON-USER: CPT | Performed by: INTERNAL MEDICINE

## 2023-11-07 NOTE — PROGRESS NOTES
"Subjective     History of Present Illness:    Dolores Salgado is a 53 y.o. female who presents to GI clinic for GI issues.  Stool frequency and urgency less problematic.  Still feels appetite still \"not good.\"  \"I have to force myself to eat.\"  Felt protein shakes and Chinese food really caused increased abd pain -- only pattern she saw in her food diary.  She's concerned about her depression and cognitive issues which have made her go on disability.  Finds she eats better if she's in a better mental state.    Unclear if the pantoprazole helped, but she's been off for a montha  Bk:  OJ    Lunch:  1/2 sandwich    Dinner:  Variable  Often just not hungry..    Review of Systems  Review of Systems    Social History   reports that she has never smoked. She has never been exposed to tobacco smoke. She has never used smokeless tobacco.     Allergies  No Known Allergies    Medications  Current Outpatient Medications   Medication Instructions    amphetamine-dextroamphetamine XR (Adderall XR) 20 mg 24 hr capsule 20 mg, oral, Every morning    aspirin 81 mg, oral, Daily    Auvelity  mg tablet, IR and ER, biphasic     b complex vitamins capsule B Complex Oral Capsule 100mcg Refills: 0 Active    cholecalciferol (Vitamin D-3) 25 MCG (1000 UT) tablet 1 tablet, oral, Daily    clonazePAM (KlonoPIN) 1 mg tablet 1 tablet, oral, Daily    cyanocobalamin, vitamin B-12, 5,000 mcg capsule B-12 CAPS Refills: 0 DO Active    DOCOSAHEXAENOIC ACID-EPA ORAL Morrison III EPA+DHA 1000 MG Oral Capsule Quantity: 0 Refills: 0 Ordered: 29-Apr-2020 DO Active    DULoxetine (CYMBALTA) 60 mg, oral, Daily    ibuprofen 800 mg tablet 1 tablet, oral, 3 times daily with meals    levothyroxine (SYNTHROID, LEVOXYL) 12.5 mcg, oral, Every morning    multivitamin (One Daily Multivitamin) tablet Multivitamin Adult Oral Tablet Refills: 0 DO Active    NON FORMULARY BRENNAN GARCIA    omega 3-dha-epa-fish oil (Fish OiL) 1,200 (144-216) mg capsule Fish Oil 1200 MG Oral Capsule " Refills: 0 Active    omega-3 acid ethyl esters (Lovaza) 1 gram capsule Omega III EPA+DHA 1000 MG Oral Capsule Refills: 0 Active    SACCHAROMYCES BOULARDII ORAL Probiotic CAPS Refills: 0 DO Active    Synthroid 50 mcg tablet     traZODone (Desyrel) 100 mg tablet 2 tablets, oral, Nightly        Objective   There were no vitals taken for this visit.   Physical Exam      Results from last 7 days   Lab Units 11/03/23  1652   WBC AUTO x10*3/uL 6.3   HEMOGLOBIN g/dL 12.6   HEMATOCRIT % 36.0   PLATELETS AUTO x10*3/uL 262     Results from last 7 days   Lab Units 11/03/23  1652   SODIUM mmol/L 137   POTASSIUM mmol/L 3.9   CHLORIDE mmol/L 103   CO2 mmol/L 28   BUN mg/dL 21   CREATININE mg/dL 0.92   CALCIUM mg/dL 9.5   GLUCOSE mg/dL 93           Assessment/Plan   Dolores Salgado is a 53 y.o. female who presents to GI clinic for diminished appetite; abdominal discomfort -- now usually lower abdominal. Elevated ESR. ? Ulcer. ? Gastritis.  Less likely neoplastic. BM generally back to normal now. NO clear food triggers.        Plan:    EGD  Discussed nutritional strategies  Rajiv Thomas MD

## 2023-11-09 ENCOUNTER — DOCUMENTATION (OUTPATIENT)
Dept: BEHAVIORAL HEALTH | Facility: CLINIC | Age: 53
End: 2023-11-09
Payer: MEDICARE

## 2023-11-09 DIAGNOSIS — F33.3 SEVERE EPISODE OF RECURRENT MAJOR DEPRESSIVE DISORDER, WITH PSYCHOTIC FEATURES (MULTI): ICD-10-CM

## 2023-11-09 RX ORDER — DEXTROMETHORPHAN HYDROBROMIDE, BUPROPION HYDROCHLORIDE 105; 45 MG/1; MG/1
1 TABLET, MULTILAYER, EXTENDED RELEASE ORAL 2 TIMES DAILY
Qty: 60 TABLET | Refills: 11 | Status: SHIPPED | OUTPATIENT
Start: 2023-11-09 | End: 2023-12-19 | Stop reason: ALTCHOICE

## 2023-11-09 RX ORDER — LEVOTHYROXINE SODIUM 25 UG/1
25 TABLET ORAL EVERY MORNING
Qty: 90 TABLET | Refills: 3 | Status: SHIPPED | OUTPATIENT
Start: 2023-11-09 | End: 2024-11-08

## 2023-11-09 NOTE — PROGRESS NOTES
Phone update:    -Working with new therapist, finds that they clique well; Dr. Fish Kwan  -Appreciates new housing and new spiritual community  -past few nights has been having more racing thoughts, this concerns her  -no longer daily SI

## 2023-11-22 ENCOUNTER — OFFICE VISIT (OUTPATIENT)
Dept: GASTROENTEROLOGY | Facility: EXTERNAL LOCATION | Age: 53
End: 2023-11-22
Payer: MEDICARE

## 2023-11-22 DIAGNOSIS — R63.4 ABNORMAL WEIGHT LOSS: ICD-10-CM

## 2023-11-22 DIAGNOSIS — R68.81 EARLY SATIETY: ICD-10-CM

## 2023-11-22 DIAGNOSIS — K29.30 CHRONIC SUPERFICIAL GASTRITIS WITHOUT BLEEDING: Primary | ICD-10-CM

## 2023-11-22 PROCEDURE — 1036F TOBACCO NON-USER: CPT | Performed by: INTERNAL MEDICINE

## 2023-11-22 PROCEDURE — 43239 EGD BIOPSY SINGLE/MULTIPLE: CPT | Performed by: INTERNAL MEDICINE

## 2023-11-22 PROCEDURE — 0753T DGTZ GLS MCRSCP SLD LEVEL IV: CPT

## 2023-11-22 PROCEDURE — 88305 TISSUE EXAM BY PATHOLOGIST: CPT

## 2023-11-22 PROCEDURE — 88305 TISSUE EXAM BY PATHOLOGIST: CPT | Performed by: PATHOLOGY

## 2023-11-23 PROCEDURE — 88305 TISSUE EXAM BY PATHOLOGIST: CPT

## 2023-11-23 PROCEDURE — 88305 TISSUE EXAM BY PATHOLOGIST: CPT | Performed by: PATHOLOGY

## 2023-11-23 PROCEDURE — 0753T DGTZ GLS MCRSCP SLD LEVEL IV: CPT

## 2023-11-25 ENCOUNTER — LAB REQUISITION (OUTPATIENT)
Dept: LAB | Facility: HOSPITAL | Age: 53
End: 2023-11-25
Payer: MEDICARE

## 2023-11-29 DIAGNOSIS — F33.3 SEVERE EPISODE OF RECURRENT MAJOR DEPRESSIVE DISORDER, WITH PSYCHOTIC FEATURES (MULTI): ICD-10-CM

## 2023-11-30 ENCOUNTER — DOCUMENTATION (OUTPATIENT)
Dept: BEHAVIORAL HEALTH | Facility: CLINIC | Age: 53
End: 2023-11-30
Payer: MEDICARE

## 2023-11-30 DIAGNOSIS — F33.3 SEVERE EPISODE OF RECURRENT MAJOR DEPRESSIVE DISORDER, WITH PSYCHOTIC FEATURES (MULTI): ICD-10-CM

## 2023-11-30 RX ORDER — DEXTROMETHORPHAN HYDROBROMIDE, BUPROPION HYDROCHLORIDE 105; 45 MG/1; MG/1
1 TABLET, MULTILAYER, EXTENDED RELEASE ORAL
Qty: 90 TABLET | Refills: 3 | Status: SHIPPED | OUTPATIENT
Start: 2023-11-30 | End: 2023-12-19 | Stop reason: ALTCHOICE

## 2023-11-30 NOTE — PROGRESS NOTES
Some symptoms creeping back in with difficulty completing tasks, harder time getting out of bed, noticed it correlated with running out of Auvelity, so awaiting refill.    Considering dating again for first time in years.

## 2023-12-01 DIAGNOSIS — F33.3 SEVERE EPISODE OF RECURRENT MAJOR DEPRESSIVE DISORDER, WITH PSYCHOTIC FEATURES (MULTI): ICD-10-CM

## 2023-12-04 RX ORDER — TRAZODONE HYDROCHLORIDE 100 MG/1
100-200 TABLET ORAL NIGHTLY PRN
Qty: 180 TABLET | Refills: 3 | Status: SHIPPED | OUTPATIENT
Start: 2023-12-04 | End: 2024-05-30 | Stop reason: SDUPTHER

## 2023-12-05 LAB
LABORATORY COMMENT REPORT: NORMAL
PATH REPORT.FINAL DX SPEC: NORMAL
PATH REPORT.GROSS SPEC: NORMAL
PATH REPORT.RELEVANT HX SPEC: NORMAL
PATH REPORT.TOTAL CANCER: NORMAL

## 2023-12-07 DIAGNOSIS — F33.3 SEVERE EPISODE OF RECURRENT MAJOR DEPRESSIVE DISORDER, WITH PSYCHOTIC FEATURES (MULTI): ICD-10-CM

## 2023-12-07 DIAGNOSIS — R05.1 ACUTE COUGH: ICD-10-CM

## 2023-12-07 RX ORDER — DEXTROMETHORPHAN HBR 15 MG/1
45 CAPSULE, LIQUID FILLED ORAL
Qty: 28 CAPSULE | Refills: 0 | Status: SHIPPED | OUTPATIENT
Start: 2023-12-07 | End: 2023-12-19 | Stop reason: ALTCHOICE

## 2023-12-07 RX ORDER — BUPROPION HYDROCHLORIDE 100 MG/1
100 TABLET ORAL
Qty: 14 TABLET | Refills: 0 | Status: SHIPPED | OUTPATIENT
Start: 2023-12-07 | End: 2023-12-21

## 2023-12-19 ENCOUNTER — OFFICE VISIT (OUTPATIENT)
Dept: PRIMARY CARE | Facility: CLINIC | Age: 53
End: 2023-12-19
Payer: MEDICARE

## 2023-12-19 VITALS
WEIGHT: 127 LBS | HEART RATE: 64 BPM | SYSTOLIC BLOOD PRESSURE: 96 MMHG | BODY MASS INDEX: 21.16 KG/M2 | DIASTOLIC BLOOD PRESSURE: 55 MMHG | TEMPERATURE: 96.8 F | HEIGHT: 65 IN | OXYGEN SATURATION: 94 %

## 2023-12-19 DIAGNOSIS — R26.89 IMPAIRMENT OF BALANCE: ICD-10-CM

## 2023-12-19 DIAGNOSIS — M54.2 NECK PAIN, MUSCULOSKELETAL: ICD-10-CM

## 2023-12-19 DIAGNOSIS — M62.81 MUSCLE WEAKNESS-GENERAL: ICD-10-CM

## 2023-12-19 DIAGNOSIS — R41.3 MEMORY LOSS: Primary | ICD-10-CM

## 2023-12-19 DIAGNOSIS — R41.89 COGNITIVE IMPAIRMENT: ICD-10-CM

## 2023-12-19 DIAGNOSIS — F33.3 SEVERE EPISODE OF RECURRENT MAJOR DEPRESSIVE DISORDER, WITH PSYCHOTIC FEATURES (MULTI): ICD-10-CM

## 2023-12-19 PROCEDURE — 1036F TOBACCO NON-USER: CPT | Performed by: STUDENT IN AN ORGANIZED HEALTH CARE EDUCATION/TRAINING PROGRAM

## 2023-12-19 PROCEDURE — 99203 OFFICE O/P NEW LOW 30 MIN: CPT | Performed by: STUDENT IN AN ORGANIZED HEALTH CARE EDUCATION/TRAINING PROGRAM

## 2023-12-19 ASSESSMENT — ENCOUNTER SYMPTOMS
PALPITATIONS: 0
DIZZINESS: 0
UNEXPECTED WEIGHT CHANGE: 0
VOMITING: 0
NAUSEA: 0
COUGH: 0
WEAKNESS: 1
COLOR CHANGE: 0
CONFUSION: 0
SHORTNESS OF BREATH: 0
FEVER: 0
MUSCULOSKELETAL NEGATIVE: 1
WHEEZING: 0
CONSTIPATION: 0
HEADACHES: 0
DIARRHEA: 0
CHILLS: 0
ABDOMINAL PAIN: 0
FATIGUE: 1

## 2023-12-19 NOTE — PATIENT INSTRUCTIONS

## 2023-12-19 NOTE — PROGRESS NOTES
"Subjective   Patient ID: Dolores Salgado is a 53 y.o. female who presents for New Patient Visit (NEW PATIENT TO ESTABLISH CARE ).    HPI   New pt here to estb care and also for Fu visit. Major depression & anxiety disorder, follows with psych; was seeing neuro in [facility] and would like to estb here in [facility]. She was seeing neuro for declining memory per pt, would like a referral. Also reports muscle weakness; fatigue easily and feels generalized weakness, concerns about muscles issues. Pt sl poor historian as she wasn't able to provide the timeline or could be her memory issues. Per pt moves alone, recently move from [facility], however also states she lived in [place] and seeing provider in [facility]. [facility] and is on multiple meds; has tried ECT and ketamine treatment w/o much help.     Review of Systems   Constitutional:  Positive for fatigue. Negative for chills, fever and unexpected weight change.   HENT: Negative.     Respiratory:  Negative for cough, shortness of breath and wheezing.    Cardiovascular:  Negative for chest pain, palpitations and leg swelling.   Gastrointestinal:  Negative for abdominal pain, constipation, diarrhea, nausea and vomiting.   Musculoskeletal: Negative.    Skin:  Negative for color change and rash.   Neurological:  Positive for weakness. Negative for dizziness and headaches.   Psychiatric/Behavioral:  Negative for behavioral problems and confusion.        Objective   BP 96/55 (BP Location: Right arm, Patient Position: Sitting, BP Cuff Size: Small adult)   Pulse 64   Temp 36 °C (96.8 °F)   Ht 1.651 m (5' 5\")   Wt 57.6 kg (127 lb)   SpO2 94%   BMI 21.13 kg/m²     Physical Exam  Vitals and nursing note reviewed.   Constitutional:       Appearance: Normal appearance.      Comments: Here with dog.    Eyes:      Extraocular Movements: Extraocular movements intact.      Pupils: Pupils are equal, round, and reactive to light.   Cardiovascular:      " Rate and Rhythm: Normal rate and regular rhythm.      Pulses: Normal pulses.      Heart sounds: Normal heart sounds.   Pulmonary:      Effort: Pulmonary effort is normal. No respiratory distress.      Breath sounds: Normal breath sounds.   Abdominal:      General: Abdomen is flat. Bowel sounds are normal.      Palpations: Abdomen is soft.   Musculoskeletal:         General: Normal range of motion.   Neurological:      General: No focal deficit present.      Mental Status: She is alert and oriented to person, place, and time.      Cranial Nerves: No cranial nerve deficit.      Sensory: No sensory deficit.      Motor: No weakness.      Coordination: Coordination normal.      Gait: Gait normal.   Psychiatric:         Mood and Affect: Mood normal.         Behavior: Behavior normal.       Assessment/Plan   New pt here to estb care and also for Fu visit. She likely has complex medical hx with severe depressive sx, currently controlled on multiple psych meds, managed by Dr. Ramos. Cont seeing as usual, weekly. No SI/HI noted. She likely has some memory/cognition issues, will put new referral to see neuro-memory for further eval & mgmt; in the mean while she will cont seeing with neuro at [facility] until she estb care with local one. Also d/t generalized weakness, more on the upper exts muscle, will put referral to see neuromuscular; rec to cont following with other providers as usual. She is otherwise clinically & vitally stable.       Problem List Items Addressed This Visit             ICD-10-CM    Neck pain, musculoskeletal M54.2    Relevant Orders    Referral to Neurology    Impairment of balance R26.89    Cognitive impairment R41.89    Relevant Orders    Referral to Neurology    Memory loss - Primary R41.3    Relevant Orders    Referral to Neurology    Severe episode of recurrent major depressive disorder, with psychotic features (CMS/HCC) F33.3     Stable mood on the meds; cont to FW Dr. Ramos as schd.           Other Visit  Diagnoses         Codes    Muscle weakness-general     M62.81    Relevant Orders    Referral to Neurology           Patient was identified as a fall risk. Risk prevention instructions provided.   Rtc 3 mo for MCR/MIRACLE Jones MD   [facility]

## 2024-01-09 DIAGNOSIS — F33.3 SEVERE EPISODE OF RECURRENT MAJOR DEPRESSIVE DISORDER, WITH PSYCHOTIC FEATURES (MULTI): ICD-10-CM

## 2024-01-09 RX ORDER — DULOXETIN HYDROCHLORIDE 60 MG/1
60 CAPSULE, DELAYED RELEASE ORAL DAILY
Qty: 90 CAPSULE | Refills: 3 | Status: SHIPPED | OUTPATIENT
Start: 2024-01-09 | End: 2024-01-10 | Stop reason: SDUPTHER

## 2024-01-10 ENCOUNTER — APPOINTMENT (OUTPATIENT)
Dept: CARDIOLOGY | Facility: HOSPITAL | Age: 54
End: 2024-01-10
Payer: MEDICARE

## 2024-01-10 ENCOUNTER — HOSPITAL ENCOUNTER (EMERGENCY)
Facility: HOSPITAL | Age: 54
Discharge: HOME | End: 2024-01-10
Attending: EMERGENCY MEDICINE
Payer: MEDICARE

## 2024-01-10 VITALS
SYSTOLIC BLOOD PRESSURE: 100 MMHG | WEIGHT: 120 LBS | BODY MASS INDEX: 19.99 KG/M2 | TEMPERATURE: 97.7 F | HEART RATE: 88 BPM | OXYGEN SATURATION: 97 % | RESPIRATION RATE: 16 BRPM | HEIGHT: 65 IN | DIASTOLIC BLOOD PRESSURE: 67 MMHG

## 2024-01-10 DIAGNOSIS — F33.3 SEVERE EPISODE OF RECURRENT MAJOR DEPRESSIVE DISORDER, WITH PSYCHOTIC FEATURES (MULTI): ICD-10-CM

## 2024-01-10 DIAGNOSIS — F32.A DEPRESSION, UNSPECIFIED DEPRESSION TYPE: ICD-10-CM

## 2024-01-10 DIAGNOSIS — U07.1 COVID: ICD-10-CM

## 2024-01-10 DIAGNOSIS — R45.851 SUICIDAL IDEATION: Primary | ICD-10-CM

## 2024-01-10 LAB
ALBUMIN SERPL BCP-MCNC: 4.3 G/DL (ref 3.4–5)
ALP SERPL-CCNC: 60 U/L (ref 33–110)
ALT SERPL W P-5'-P-CCNC: 11 U/L (ref 7–45)
AMPHETAMINES UR QL SCN: ABNORMAL
ANION GAP SERPL CALC-SCNC: 12 MMOL/L (ref 10–20)
APAP SERPL-MCNC: <10 UG/ML
AST SERPL W P-5'-P-CCNC: 18 U/L (ref 9–39)
ATRIAL RATE: 94 BPM
BARBITURATES UR QL SCN: ABNORMAL
BASOPHILS # BLD AUTO: 0.03 X10*3/UL (ref 0–0.1)
BASOPHILS NFR BLD AUTO: 0.5 %
BENZODIAZ UR QL SCN: ABNORMAL
BILIRUB SERPL-MCNC: 0.9 MG/DL (ref 0–1.2)
BUN SERPL-MCNC: 20 MG/DL (ref 6–23)
BZE UR QL SCN: ABNORMAL
CALCIUM SERPL-MCNC: 9.7 MG/DL (ref 8.6–10.3)
CANNABINOIDS UR QL SCN: ABNORMAL
CHLORIDE SERPL-SCNC: 104 MMOL/L (ref 98–107)
CO2 SERPL-SCNC: 26 MMOL/L (ref 21–32)
CREAT SERPL-MCNC: 0.77 MG/DL (ref 0.5–1.05)
EGFRCR SERPLBLD CKD-EPI 2021: >90 ML/MIN/1.73M*2
EOSINOPHIL # BLD AUTO: 0.13 X10*3/UL (ref 0–0.7)
EOSINOPHIL NFR BLD AUTO: 2.2 %
ERYTHROCYTE [DISTWIDTH] IN BLOOD BY AUTOMATED COUNT: 12.6 % (ref 11.5–14.5)
ETHANOL SERPL-MCNC: <10 MG/DL
FENTANYL+NORFENTANYL UR QL SCN: ABNORMAL
GLUCOSE SERPL-MCNC: 88 MG/DL (ref 74–99)
HCT VFR BLD AUTO: 44.3 % (ref 36–46)
HGB BLD-MCNC: 15.2 G/DL (ref 12–16)
IMM GRANULOCYTES # BLD AUTO: 0.01 X10*3/UL (ref 0–0.7)
IMM GRANULOCYTES NFR BLD AUTO: 0.2 % (ref 0–0.9)
LYMPHOCYTES # BLD AUTO: 0.4 X10*3/UL (ref 1.2–4.8)
LYMPHOCYTES NFR BLD AUTO: 6.6 %
MCH RBC QN AUTO: 30.8 PG (ref 26–34)
MCHC RBC AUTO-ENTMCNC: 34.3 G/DL (ref 32–36)
MCV RBC AUTO: 90 FL (ref 80–100)
MONOCYTES # BLD AUTO: 0.46 X10*3/UL (ref 0.1–1)
MONOCYTES NFR BLD AUTO: 7.6 %
NEUTROPHILS # BLD AUTO: 4.99 X10*3/UL (ref 1.2–7.7)
NEUTROPHILS NFR BLD AUTO: 82.9 %
NRBC BLD-RTO: 0 /100 WBCS (ref 0–0)
OPIATES UR QL SCN: ABNORMAL
OXYCODONE+OXYMORPHONE UR QL SCN: ABNORMAL
P AXIS: 82 DEGREES
PCP UR QL SCN: ABNORMAL
PLATELET # BLD AUTO: 312 X10*3/UL (ref 150–450)
POTASSIUM SERPL-SCNC: 4.2 MMOL/L (ref 3.5–5.3)
PR INTERVAL: 167 MS
PROT SERPL-MCNC: 7.5 G/DL (ref 6.4–8.2)
Q ONSET: 249 MS
QRS COUNT: 14 BEATS
QRS DURATION: 103 MS
QT INTERVAL: 358 MS
QTC CALCULATION(BAZETT): 436 MS
QTC FREDERICIA: 408 MS
R AXIS: 57 DEGREES
RBC # BLD AUTO: 4.94 X10*6/UL (ref 4–5.2)
SALICYLATES SERPL-MCNC: <3 MG/DL
SARS-COV-2 RNA RESP QL NAA+PROBE: DETECTED
SODIUM SERPL-SCNC: 138 MMOL/L (ref 136–145)
T AXIS: 49 DEGREES
T OFFSET: 428 MS
VENTRICULAR RATE: 89 BPM
WBC # BLD AUTO: 6 X10*3/UL (ref 4.4–11.3)

## 2024-01-10 PROCEDURE — 80307 DRUG TEST PRSMV CHEM ANLYZR: CPT | Performed by: EMERGENCY MEDICINE

## 2024-01-10 PROCEDURE — 36415 COLL VENOUS BLD VENIPUNCTURE: CPT | Performed by: EMERGENCY MEDICINE

## 2024-01-10 PROCEDURE — 87635 SARS-COV-2 COVID-19 AMP PRB: CPT | Performed by: EMERGENCY MEDICINE

## 2024-01-10 PROCEDURE — 99285 EMERGENCY DEPT VISIT HI MDM: CPT | Performed by: EMERGENCY MEDICINE

## 2024-01-10 PROCEDURE — 85025 COMPLETE CBC W/AUTO DIFF WBC: CPT | Performed by: EMERGENCY MEDICINE

## 2024-01-10 PROCEDURE — 80143 DRUG ASSAY ACETAMINOPHEN: CPT | Performed by: EMERGENCY MEDICINE

## 2024-01-10 PROCEDURE — 80053 COMPREHEN METABOLIC PANEL: CPT | Performed by: EMERGENCY MEDICINE

## 2024-01-10 PROCEDURE — 93005 ELECTROCARDIOGRAM TRACING: CPT

## 2024-01-10 RX ORDER — DULOXETIN HYDROCHLORIDE 60 MG/1
60 CAPSULE, DELAYED RELEASE ORAL DAILY
Qty: 30 CAPSULE | Refills: 11 | Status: SHIPPED | OUTPATIENT
Start: 2024-01-10 | End: 2024-01-25 | Stop reason: SDUPTHER

## 2024-01-10 RX ORDER — ACETAMINOPHEN 325 MG/1
650 TABLET ORAL ONCE
Status: COMPLETED | OUTPATIENT
Start: 2024-01-10 | End: 2024-01-10

## 2024-01-10 RX ADMIN — ACETAMINOPHEN 650 MG: 325 TABLET ORAL at 17:42

## 2024-01-10 SDOH — HEALTH STABILITY: MENTAL HEALTH: NEEDS EXPRESSED: DENIES

## 2024-01-10 SDOH — SOCIAL STABILITY: SOCIAL NETWORK: PARENT/GUARDIAN/SIGNIFICANT OTHER INVOLVEMENT: ATTENTIVE TO PATIENT NEEDS

## 2024-01-10 SDOH — HEALTH STABILITY: MENTAL HEALTH: SUICIDE ASSESSMENT: ADULT (C-SSRS)

## 2024-01-10 SDOH — HEALTH STABILITY: MENTAL HEALTH: HAVE YOU EVER DONE ANYTHING, STARTED TO DO ANYTHING, OR PREPARED TO DO ANYTHING TO END YOUR LIFE?: NO

## 2024-01-10 SDOH — HEALTH STABILITY: MENTAL HEALTH: HAVE YOU WISHED YOU WERE DEAD OR WISHED YOU COULD GO TO SLEEP AND NOT WAKE UP?: YES

## 2024-01-10 SDOH — HEALTH STABILITY: MENTAL HEALTH: BEHAVIORS/MOOD: SLEEPING

## 2024-01-10 SDOH — SOCIAL STABILITY: SOCIAL NETWORK: VISITOR BEHAVIORS: APPROPRIATE FOR SITUATION

## 2024-01-10 SDOH — HEALTH STABILITY: MENTAL HEALTH: HAVE YOU ACTUALLY HAD ANY THOUGHTS OF KILLING YOURSELF?: NO

## 2024-01-10 ASSESSMENT — LIFESTYLE VARIABLES
HAVE YOU EVER FELT YOU SHOULD CUT DOWN ON YOUR DRINKING: NO
REASON UNABLE TO ASSESS: NO
EVER FELT BAD OR GUILTY ABOUT YOUR DRINKING: NO
HAVE PEOPLE ANNOYED YOU BY CRITICIZING YOUR DRINKING: NO
EVER HAD A DRINK FIRST THING IN THE MORNING TO STEADY YOUR NERVES TO GET RID OF A HANGOVER: NO

## 2024-01-10 ASSESSMENT — PAIN - FUNCTIONAL ASSESSMENT: PAIN_FUNCTIONAL_ASSESSMENT: 0-10

## 2024-01-10 ASSESSMENT — PAIN SCALES - GENERAL: PAINLEVEL_OUTOF10: 0 - NO PAIN

## 2024-01-10 ASSESSMENT — COLUMBIA-SUICIDE SEVERITY RATING SCALE - C-SSRS
1. IN THE PAST MONTH, HAVE YOU WISHED YOU WERE DEAD OR WISHED YOU COULD GO TO SLEEP AND NOT WAKE UP?: YES
2. HAVE YOU ACTUALLY HAD ANY THOUGHTS OF KILLING YOURSELF?: YES
5. HAVE YOU STARTED TO WORK OUT OR WORKED OUT THE DETAILS OF HOW TO KILL YOURSELF? DO YOU INTEND TO CARRY OUT THIS PLAN?: YES
6. HAVE YOU EVER DONE ANYTHING, STARTED TO DO ANYTHING, OR PREPARED TO DO ANYTHING TO END YOUR LIFE?: YES
6. HAVE YOU EVER DONE ANYTHING, STARTED TO DO ANYTHING, OR PREPARED TO DO ANYTHING TO END YOUR LIFE?: YES
4. HAVE YOU HAD THESE THOUGHTS AND HAD SOME INTENTION OF ACTING ON THEM?: YES

## 2024-01-10 NOTE — CONSULTS
BEHAVIORAL HEALTH INITIAL CONSULTATION NOTE    Referring Provider: Dr. Fredi Sanchez DO    Consultation information:  Consults   Visit type: Virtual evaluation    HISTORY OF PRESENT ILLNESS:  Dolores Salgado is a 53 y.o. female with a psychiatric history of PTSD, depression, anxiety, ADHD and medical history of a stroke, gastritis, hypothyroidism, and JUDSON who presents to [facility] ED for depression and suicidal ideation. EPAT consulted for further evaluation.     Patient reports missing approximately one week of her Cymbalta 60mg prescription due to running out and going on vacation. She has since obtained the prescription. However, she has noticed a dip in her mood during this period. Patient notes her sleep and appetite have been dysregulated. Today, patient was watching the news when a story on suicide appeared. This triggered patient to experience a panic attack as her partner committed suicide three years prior. Patient called her friend, but had difficulty controlling spiraling thoughts. Patient's friend then brought her to the emergency room.     Patient reports feeling better now. She mainly feels tired and expresses a desire to sleep. Patient states prior to a week ago her mood was stable. She has remained compliant with her other medications (Propranolol, Trazodone, Klonopin). At baseline, patient states she experiences passive SI. However, denies intent or plan. She [place] through daily meditation. Patient lists her dog and friends as major supports. Patient reports last active SI was two years ago. She denies any history of suicide attempts. Patient denies safety concerns, including access to guns. She has follow-up with her psychiatric provider, Dr. Ramos on 1/11/24. ROS negative HI and A/VH. Patient endorses weekly use of cannabis and consuming two glasses of wine per week.     Past Psychiatric History  Current/Previous Diagnoses:  PTSD, depression, anxiety, ADHD  Current Psychiatrist/Provider:  Dr. Vance  "Richard  Past Medication Trials:  Remeron, Adderall, Wellbutrin, Concerta, Trazodone, Cymbalta, Propanolol, synthroid, auvelity    Hx of ECT and Ketamine   Inpatient Hospitalizations:  Two, last in 2018  Suicide Attempts: Denies  Homicide attempts/Violence: Denies  Self Harm/Self Injurious:  Last 3 years ago - scratching/pinking self    Substance Abuse History  Alcohol use history:  Two glasses of wine per week  Cannabis use history:  Every other day, has a medical marijuana card  Illicit Drug Use History: Denies    Social History  Household: lives with roommate  Legal hx: Denies  Weapons at home and access to lethal means: Denies  Rastafari: [employer]ism    OARRS REVIEW  [facility]    ALLERGIES  Patient has no known allergies.    FAMILY HISTORY  Family History   Problem Relation Name Age of Onset    Lymphoma Mother      Coronary artery disease Father      Alzheimer's disease Maternal Grandmother      Parkinsonism Maternal Grandfather          PSYCHIATRIC REVIEW OF SYSTEMS  Depression: depressed mood, sleep disturbance: difficulty falling asleep, fatigue or loss of energy, and changes in appetite or weight leading to significant weight loss or gain unintentionally   Anxiety: panic attacks: nausea, trembling/shaking, shortness of breath , and lightheadedness/dizziness  La: negative  Psychosis: negative    OBJECTIVE    VITALS      12/29/2021     1:58 PM 8/2/2023    10:24 AM 11/3/2023     3:23 PM 11/3/2023     7:21 PM 11/7/2023     2:40 PM 12/19/2023     4:57 PM 1/10/2024    11:45 AM   Vitals   Systolic  88 105 110  96 100   Diastolic  62 57 60  55 67   Heart Rate   74 70  64 88   Temp 35.8 °C (96.5 °F) 36 °C (96.8 °F) 36.8 °C (98.2 °F)   36 °C (96.8 °F) 36.5 °C (97.7 °F)   Resp 14  18 18   16   Height (in) 1.651 m (5' 5\")  1.651 m (5' 5\")  1.651 m (5' 5\") 1.651 m (5' 5\") 1.651 m (5' 5\")   Weight (lb) 147 122.25 119.93  119 127 120   BMI 24.46 kg/m2 20.34 kg/m2 19.96 kg/m2  19.8 " "kg/m2 21.13 kg/m2 19.97 kg/m2   BSA (m2) 1.75 m2 1.6 m2 1.58 m2  1.57 m2 1.63 m2 1.58 m2   Visit Report     Report Report       Body mass index is 19.97 kg/m².  Facility age limit for growth %abdias is 20 years.  Wt Readings from Last 4 Encounters:   01/10/24 54.4 kg (120 lb)   12/19/23 57.6 kg (127 lb)   11/07/23 54 kg (119 lb)   11/03/23 54.4 kg (119 lb 14.9 oz)       Mental Status Exam  General: NAD, seated comfortably during interview.  Appearance: Appeared as age stated; appropriately dressed/groomed.  Attitude:  Calm, cooperative  Behavior: Fair EC; overall responding appropriately  Motor Activity: No notable steven PMAR  Speech: Clear, with fair phonation, and no lisp nor dysarthria.   Mood: \"Better\"  Affect: Dysthymic; constricted range/intensity; appropriate and congruent  Thought Process: Linear and logical; not perseverating   Thought Content: Denied SI/HI. Not voicing/endorsing delusions.  Thought Perception: Did not appear to be responding to internal stimuli. Not endorsing AVH  Cognition: Grossly intact; A&O x4/4 to self, place, date, and context.  Insight: Fair  Judgement: Fair    HOME MEDICATIONS  Medication Documentation Review Audit       Reviewed by Nayan Jones MD (Physician) on 12/19/23 at 1725      Medication Order Taking? Sig Documenting Provider Last Dose Status     Discontinued 12/19/23 1706   aspirin 81 mg EC tablet [id number] Yes Take 1 tablet (81 mg) by mouth once daily. Historical Provider, MD Taking Active   Auvelity  mg tablet, IR and ER, biphasic [id number] Yes  Historical Provider, MD Taking Active   b complex vitamins capsule [id number] Yes B Complex Oral Capsule 100mcg Refills: 0 Active Historical Provider, MD Taking Active   buPROPion (Wellbutrin) 100 mg tablet [id number] Yes Take 1 tablet (100 mg) by mouth once every day for 14 days. Rajiv Ramos MD Taking Active   cholecalciferol (Vitamin D-3) 25 MCG (1000 UT) tablet [id number] Yes Take 1 tablet (25 mcg) by mouth once daily. " Lori Cuevas MD Taking Active   clonazePAM (KlonoPIN) 1 mg tablet [id number] Yes Take 1 tablet (1 mg) by mouth once daily. Historical MD Faith Taking Active   cyanocobalamin, vitamin B-12, 5,000 mcg capsule [id number] Yes B-12 CAPS Refills: 0 DO Active Lori Cuevas MD Taking Active   Patient not taking:  Discontinued 23 1706     Discontinued 23 1706   Patient not taking:  Discontinued 23 1706   dextromethorphan-bupropion  mg tablet, IR and ER, biphasic [id number]  Take 1 tablet by mouth once every day for 14 days. Rajiv Ramos MD   23 2352     Discontinued 23 170   DULoxetine (Cymbalta) 60 mg DR capsule [id number] Yes Take 1 capsule (60 mg) by mouth once daily. Historical MD Faith Taking Active   ibuprofen 800 mg tablet [id number] Yes Take 1 tablet (800 mg) by mouth 3 times a day with meals. Lori Cuevas MD Taking Active   levothyroxine (Synthroid, Levoxyl) 25 mcg tablet [id number] Yes Take 1 tablet (25 mcg) by mouth once daily in the morning. Rajiv Ramos MD Taking Active   multivitamin (One Daily Multivitamin) tablet [id number] Yes Multivitamin Adult Oral Tablet Refills: 0 DO Active Lori Cuevas MD Taking Active   NON FORMULARY [id number] Yes LIHERMILA RADHA Cuevas MD Taking Active   omega 3-dha-epa-fish oil (Fish OiL) 1,200 (144-216) mg capsule [id number] Yes Fish Oil 1200 MG Oral Capsule Refills: 0 Active Lori Cuevas MD Taking Active   omega-3 acid ethyl esters (Lovaza) 1 gram capsule [id number] Yes Omega III EPA+DHA 1000 MG Oral Capsule Refills: 0 Active Lori Cuevas MD Taking Active   SACCHAROMYCES BOULARDII ORAL [id number] Yes Probiotic CAPS Refills: 0 DO Active Lori Cuevas MD Taking Active    Discontinued 23 1718   traZODone (Desyrel) 100 mg tablet [id number] Yes TAKE 1 TO 2 TABLETS AT BEDTIME AS NEEDED FOR INSOMNIA Rajiv Ramos MD Taking Active                   LABS  Admission on 01/10/2024    Component Date Value Ref Range Status    WBC 01/10/2024 6.0  4.4 - 11.3 x10*3/uL Final    nRBC 01/10/2024 0.0  0.0 - 0.0 /100 WBCs Final    RBC 01/10/2024 4.94  4.00 - 5.20 x10*6/uL Final    Hemoglobin 01/10/2024 15.2  12.0 - 16.0 g/dL Final    Hematocrit 01/10/2024 44.3  36.0 - 46.0 % Final    MCV 01/10/2024 90  80 - 100 fL Final    MCH 01/10/2024 30.8  26.0 - 34.0 pg Final    MCHC 01/10/2024 34.3  32.0 - 36.0 g/dL Final    RDW 01/10/2024 12.6  11.5 - 14.5 % Final    Platelets 01/10/2024 312  150 - 450 x10*3/uL Final    Neutrophils % 01/10/2024 82.9  40.0 - 80.0 % Final    Immature Granulocytes %, Automated 01/10/2024 0.2  0.0 - 0.9 % Final    Immature Granulocyte Count (IG) includes promyelocytes, myelocytes and metamyelocytes but does not include bands. Percent differential counts (%) should be interpreted in the context of the absolute cell counts (cells/UL).    Lymphocytes % 01/10/2024 6.6  13.0 - 44.0 % Final    Monocytes % 01/10/2024 7.6  2.0 - 10.0 % Final    Eosinophils % 01/10/2024 2.2  0.0 - 6.0 % Final    Basophils % 01/10/2024 0.5  0.0 - 2.0 % Final    Neutrophils Absolute 01/10/2024 4.99  1.20 - 7.70 x10*3/uL Final    Percent differential counts (%) should be interpreted in the context of the absolute cell counts (cells/uL).    Immature Granulocytes Absolute, Au* 01/10/2024 0.01  0.00 - 0.70 x10*3/uL Final    Lymphocytes Absolute 01/10/2024 0.40 (L)  1.20 - 4.80 x10*3/uL Final    Monocytes Absolute 01/10/2024 0.46  0.10 - 1.00 x10*3/uL Final    Eosinophils Absolute 01/10/2024 0.13  0.00 - 0.70 x10*3/uL Final    Basophils Absolute 01/10/2024 0.03  0.00 - 0.10 x10*3/uL Final    Glucose 01/10/2024 88  74 - 99 mg/dL Final    Sodium 01/10/2024 138  136 - 145 mmol/L Final    Potassium 01/10/2024 4.2  3.5 - 5.3 mmol/L Final    Chloride 01/10/2024 104  98 - 107 mmol/L Final    Bicarbonate 01/10/2024 26  21 - 32 mmol/L Final    Anion Gap 01/10/2024 12  10 - 20 mmol/L Final    Urea Nitrogen 01/10/2024 20  6 -  23 mg/dL Final    Creatinine 01/10/2024 0.77  0.50 - 1.05 mg/dL Final    eGFR 01/10/2024 >90  >60 mL/min/1.73m*2 Final    Calculations of estimated GFR are performed using the 2021 CKD-EPI Study Refit equation without the race variable for the IDMS-Traceable creatinine methods.  https://jasn.asnjournals.org/content/early/2021/09/22/ASN.0636677458    Calcium 01/10/2024 9.7  8.6 - 10.3 mg/dL Final    Albumin 01/10/2024 4.3  3.4 - 5.0 g/dL Final    Alkaline Phosphatase 01/10/2024 60  33 - 110 U/L Final    Total Protein 01/10/2024 7.5  6.4 - 8.2 g/dL Final    AST 01/10/2024 18  9 - 39 U/L Final    Bilirubin, Total 01/10/2024 0.9  0.0 - 1.2 mg/dL Final    ALT 01/10/2024 11  7 - 45 U/L Final    Patients treated with Sulfasalazine may generate falsely decreased results for ALT.    Amphetamine Screen, Urine 01/10/2024 Presumptive Negative  Presumptive Negative Final    CUTOFF LEVEL: 500 NG/ML   Cross-reactivity has been reported with high concentrations   of the following drugs: buproprion, chloroquine, chlorpromazine,   ephedrine, mephentermine, fenfluramine, phentermine,   phenylpropanolamine, pseudoephedrine, and propranolol.    Barbiturate Screen, Urine 01/10/2024 Presumptive Negative  Presumptive Negative Final    CUTOFF LEVEL: 200 NG/ML    Benzodiazepines Screen, Urine 01/10/2024 Presumptive Negative  Presumptive Negative Final    CUTOFF LEVEL: 200 NG/ML    Cannabinoid Screen, Urine 01/10/2024 Presumptive Positive (A)  Presumptive Negative Final    CUTOFF LEVEL: 50 NG/ML    Cocaine Metabolite Screen, Urine 01/10/2024 Presumptive Negative  Presumptive Negative Final    CUTOFF LEVEL: 150 NG/ML    Fentanyl Screen, Urine 01/10/2024 Presumptive Negative  Presumptive Negative Final    CUTOFF LEVEL: 5 NG/ML    Opiate Screen, Urine 01/10/2024 Presumptive Negative  Presumptive Negative Final    CUTOFF LEVEL: 300 NG/ML  The opiate screen does not detect fentanyl, meperidine, or   tramadol. Oxycodone is not consistently detected  (refer to  Oxycodone Screen, Urine result).    Oxycodone Screen, Urine 01/10/2024 Presumptive Negative  Presumptive Negative Final    CUTOFF LEVEL: 100 NG/ML  This test will accurately detect both oxycodone and oxymorphone.    PCP Screen, Urine 01/10/2024 Presumptive Negative  Presumptive Negative Final    CUTOFF LEVEL:  25 NG/ML  Cross-reactivity has been reported with dextromethorphan.    Acetaminophen 01/10/2024 <10.0  10.0 - 30.0 ug/mL Final    Salicylate  01/10/2024 <3  4 - 20 mg/dL Final    Alcohol 01/10/2024 <10  <=10 mg/dL Final    Ventricular Rate 01/10/2024 89  BPM Final    Atrial Rate 01/10/2024 94  BPM Final    ND Interval 01/10/2024 167  ms Final    QRS Duration 01/10/2024 103  ms Final    QT Interval 01/10/2024 358  ms Final    QTC Calculation(Bazett) 01/10/2024 436  ms Final    P Axis 01/10/2024 82  degrees Final    R Axis 01/10/2024 57  degrees Final    T Axis 01/10/2024 49  degrees Final    QRS Count 01/10/2024 14  beats Final    Q Onset 01/10/2024 249  ms Final    T Offset 01/10/2024 428  ms Final    QTC Fredericia 01/10/2024 408  ms Final    Coronavirus 2019, PCR 01/10/2024 Detected (A)  Not Detected Final     PSYCHIATRIC RISK ASSESSMENT  Violence Risk Factors:  current psychiatric illness  Acute Risk of Harm to Others is Considered: Low  Suicide Risk Factors: history of trauma or abuse, chronic medical illness, and current psychiatric illness  Protective Factors: [employer] affiliation/spirituality, strong coping skills, social support/connectedness, and hopefulness/future-orientation  Acute Risk of Harm to Self is Considered: Moderate    Assessment/Plan   Dolores Salgado is a 53 y.o. female with a psychiatric history of PTSD, depression, anxiety, ADHD and medical history of stroke, gastritis, hypothyroidism, and JUDSON who presents to [facility] ED for depression and suicidal ideation. EPAT consulted for further evaluation. Visit prompted after patient had a panic attack that was triggered by a news  article on suicide. Patient has a past trauma of her partner committing suicide. With time, patient states her panic symptoms have subsided. However, her mood has been lower over the last week due to missing Cymbalta while on vacation. She is currently denying active suicidal ideation, plan, or intent. Patient is forward thinking and lists her support dog and friends as protective factors. Patient has since obtained her Cymbalta prescription and has outpatient follow-up with her psychiatrist tomorrow. Patient was offered voluntary hospitalization, however she feels comfortable returning home. She denied access to weapons and lives with a supportive roommate. Patient does not meet criteria for involuntary inpatient psychiatric admission.    IMPRESSION:  Major Depressive Disorder, severe, without psychotic features  Unspecified Anxiety    PLAN:  - Patient does not meet criteria for involuntary inpatient psychiatric hospitalization   - Continue home psychotropic regimen  - Follow up with Dr. Ramos on 1/11/24 (sent email)   - Reviewed safety planning  - Call 911, crisis line, or present to ER for acute concerns     Recs relayed to ER provider  Patient discussed with attending psychiatrist, Dr. Palacio

## 2024-01-10 NOTE — PROGRESS NOTES
This progress note represents a emergency department transition note for signout of care.    Patient care was signed out to me. Please see the previous provider's notes for the full history and physical. Briefly, Dolores Salgado is 53-year-old who presents to the emergency department with depression and suicidal thoughts.  Patient has been off of her duloxetine.  Worsening thoughts of suicide last few days.  Patient has been prior psychiatrically hospitalized.  Patient laboratory results today were significant for cannabinoid positive urinary drug screen in addition to asymptomatic COVID test.  Patient was signed out pending EPAT evaluation recommendations.  The patient was evaluated EPAT.  They believe that the patient did not require psychiatric inpatient admission.  I went and evaluated patient.  She does endorse having worsening depression but states that she currently has no plan for suicidal attempts.  She has never attempted in the past.  She does live with a roommate has a friend at bedside.  She has an appointment scheduled with a psychiatrist tomorrow morning at 9 AM.  Patient also advised supportive care instructions regarding COVID, including handwashing, return precautions, quarantine instructions.  She felt comfortable being discharged and return to emergency department if there is any worsening thoughts of depression.  I do not believe that she is immediate danger to self.  Patient was then discharged from the emergency department.    Evens Sanchez DO  Emergency Medicine    ED Course as of 01/10/24 1852   Wed Domingo 10, 2024   1728 Tylenol ordered for the patient. [WJ]      ED Course User Index  [WJ] Fredi Sanchez DO         Diagnoses as of 01/10/24 1852   Suicidal ideation   Depression, unspecified depression type   COVID

## 2024-01-10 NOTE — ED PROVIDER NOTES
HPI   Chief Complaint   Patient presents with   • Suicidal     Off meds for 1 week.  Started 2 days ago.         Patient presents with depression and suicidal thoughts.  She states that over the past week she has been off of her duloxetine and has not felt well.  She started feeling suicidal couple days ago.  She states that she watched something about suicide and it triggered these depressive thoughts.  She had a partner that committed suicide 3 years ago as well.  She states that she has been admitted to psychiatric hospitals previously.  She has not expressed any plan for suicide or thoughts.  She followed up with her counselor yesterday.  She follows with a Dr. Ramos with psychiatry.                          [place] Coma Scale Score: 15                  Patient History   Past Medical History:   Diagnosis Date   • Change in bowel habits 11/05/2023   • Diarrhea 10/09/2023   • Epigastric pain 04/12/2023   • Incontinence of feces 11/05/2023   • Rectal bleeding 08/22/2023     Past Surgical History:   Procedure Laterality Date   • TONSILLECTOMY  04/12/2018    Tonsillectomy     Family History   Problem Relation Name Age of Onset   • Lymphoma Mother     • Coronary artery disease Father     • Alzheimer's disease Maternal Grandmother     • Parkinsonism Maternal Grandfather       Social History     Tobacco Use   • Smoking status: Never     Passive exposure: Never   • Smokeless tobacco: Never   Substance Use Topics   • Alcohol use: Not on file   • Drug use: Not on file       Physical Exam   ED Triage Vitals [01/10/24 1145]   Temp Heart Rate Resp BP   36.5 °C (97.7 °F) 88 16 100/67      SpO2 Temp Source Heart Rate Source Patient Position   97 % Tympanic -- Sitting      BP Location FiO2 (%)     Right arm --       Physical Exam  Vitals and nursing note reviewed.   Constitutional:       Appearance: Normal appearance.   HENT:      Head: Normocephalic and atraumatic.      Mouth/Throat:      Mouth: Mucous membranes are moist.    Eyes:      Extraocular Movements: Extraocular movements intact.      Pupils: Pupils are equal, round, and reactive to light.   Cardiovascular:      Rate and Rhythm: Normal rate and regular rhythm.      Heart sounds: No murmur heard.  Pulmonary:      Effort: Pulmonary effort is normal. No respiratory distress.      Breath sounds: Normal breath sounds.   Abdominal:      General: There is no distension.      Palpations: Abdomen is soft.      Tenderness: There is no abdominal tenderness.   Musculoskeletal:         General: No deformity. Normal range of motion.   Skin:     General: Skin is warm and dry.   Neurological:      General: No focal deficit present.      Mental Status: She is alert and oriented to person, place, and time.      Sensory: No sensory deficit.      Motor: No weakness.   Psychiatric:         Mood and Affect: Mood is depressed.         Thought Content: Thought content includes suicidal ideation. Thought content does not include suicidal plan.       Labs Reviewed   CBC WITH AUTO DIFFERENTIAL   COMPREHENSIVE METABOLIC PANEL   DRUG SCREEN,URINE   ACUTE TOXICOLOGY PANEL, BLOOD   SARS-COV-2 PCR, SCREEN ASYMPTOMATIC     No orders to display     ED Course & MDM   Diagnoses as of 01/10/24 1536   Suicidal ideation   Depression, unspecified depression type       Medical Decision Making  Differentials include depression, suicidality. Imaging studies, if performed, were independently reviewed and interpreted by myself and confirmed by radiologist. EKG(s), if performed, were interpreted by myself. Patient is screening EKG which was sinus rhythm at 89.  No acute ST changes.  QTc 436, .  Laboratory studies were unremarkable except for a positive COVID test and cannabinoid screen which was positive.  Patient will be evaluated by EPAT.  Disposition is pending psychiatric evaluation.        Procedure  Procedures     Bro Collins MD  01/10/24 1536

## 2024-01-11 ENCOUNTER — APPOINTMENT (OUTPATIENT)
Dept: NEUROLOGY | Facility: CLINIC | Age: 54
End: 2024-01-11
Payer: MEDICARE

## 2024-01-11 RX ORDER — DULOXETIN HYDROCHLORIDE 30 MG/1
30 CAPSULE, DELAYED RELEASE ORAL DAILY
Qty: 30 CAPSULE | OUTPATIENT
Start: 2024-01-11

## 2024-01-25 DIAGNOSIS — F33.3 SEVERE EPISODE OF RECURRENT MAJOR DEPRESSIVE DISORDER, WITH PSYCHOTIC FEATURES (MULTI): ICD-10-CM

## 2024-01-25 RX ORDER — DULOXETIN HYDROCHLORIDE 30 MG/1
60 CAPSULE, DELAYED RELEASE ORAL DAILY
Qty: 60 CAPSULE | Refills: 0 | Status: SHIPPED | OUTPATIENT
Start: 2024-01-25 | End: 2024-02-24

## 2024-02-01 ENCOUNTER — APPOINTMENT (OUTPATIENT)
Dept: RADIOLOGY | Facility: HOSPITAL | Age: 54
End: 2024-02-01
Payer: MEDICARE

## 2024-02-01 ENCOUNTER — HOSPITAL ENCOUNTER (EMERGENCY)
Facility: HOSPITAL | Age: 54
Discharge: HOME | End: 2024-02-01
Attending: EMERGENCY MEDICINE
Payer: MEDICARE

## 2024-02-01 VITALS
OXYGEN SATURATION: 98 % | RESPIRATION RATE: 16 BRPM | HEIGHT: 65 IN | BODY MASS INDEX: 19.49 KG/M2 | DIASTOLIC BLOOD PRESSURE: 66 MMHG | HEART RATE: 63 BPM | WEIGHT: 117 LBS | SYSTOLIC BLOOD PRESSURE: 91 MMHG | TEMPERATURE: 98.2 F
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
## 2024-02-01 PROCEDURE — 71250 CT THORAX DX C-: CPT | Mod: FOREIGN READ | Performed by: RADIOLOGY

## 2024-02-01 PROCEDURE — 74176 CT ABD & PELVIS W/O CONTRAST: CPT

## 2024-02-01 PROCEDURE — 70450 CT HEAD/BRAIN W/O DYE: CPT | Performed by: RADIOLOGY

## 2024-02-01 PROCEDURE — 72125 CT NECK SPINE W/O DYE: CPT

## 2024-02-01 PROCEDURE — 99285 EMERGENCY DEPT VISIT HI MDM: CPT

## 2024-02-01 PROCEDURE — 73560 X-RAY EXAM OF KNEE 1 OR 2: CPT | Mod: RIGHT SIDE | Performed by: RADIOLOGY

## 2024-02-01 PROCEDURE — 72125 CT NECK SPINE W/O DYE: CPT | Performed by: RADIOLOGY

## 2024-02-01 PROCEDURE — 74176 CT ABD & PELVIS W/O CONTRAST: CPT | Mod: FOREIGN READ | Performed by: RADIOLOGY

## 2024-02-01 PROCEDURE — 99291 CRITICAL CARE FIRST HOUR: CPT | Performed by: EMERGENCY MEDICINE

## 2024-02-01 PROCEDURE — 73560 X-RAY EXAM OF KNEE 1 OR 2: CPT | Mod: LT

## 2024-02-01 PROCEDURE — 99285 EMERGENCY DEPT VISIT HI MDM: CPT | Mod: 25

## 2024-02-01 PROCEDURE — 70450 CT HEAD/BRAIN W/O DYE: CPT

## 2024-02-01 PROCEDURE — 73560 X-RAY EXAM OF KNEE 1 OR 2: CPT | Mod: RT

## 2024-02-01 ASSESSMENT — COLUMBIA-SUICIDE SEVERITY RATING SCALE - C-SSRS
1. IN THE PAST MONTH, HAVE YOU WISHED YOU WERE DEAD OR WISHED YOU COULD GO TO SLEEP AND NOT WAKE UP?: NO
6. HAVE YOU EVER DONE ANYTHING, STARTED TO DO ANYTHING, OR PREPARED TO DO ANYTHING TO END YOUR LIFE?: NO
2. HAVE YOU ACTUALLY HAD ANY THOUGHTS OF KILLING YOURSELF?: NO

## 2024-02-01 ASSESSMENT — LIFESTYLE VARIABLES
HAVE YOU EVER FELT YOU SHOULD CUT DOWN ON YOUR DRINKING: NO
EVER FELT BAD OR GUILTY ABOUT YOUR DRINKING: NO
EVER HAD A DRINK FIRST THING IN THE MORNING TO STEADY YOUR NERVES TO GET RID OF A HANGOVER: NO
HAVE PEOPLE ANNOYED YOU BY CRITICIZING YOUR DRINKING: NO

## 2024-02-01 ASSESSMENT — PAIN SCALES - GENERAL: PAINLEVEL_OUTOF10: 2

## 2024-02-01 ASSESSMENT — PAIN DESCRIPTION - LOCATION: LOCATION: NECK

## 2024-02-01 ASSESSMENT — PAIN - FUNCTIONAL ASSESSMENT: PAIN_FUNCTIONAL_ASSESSMENT: 0-10
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
## 2024-02-08 ENCOUNTER — DOCUMENTATION (OUTPATIENT)
Dept: OBSTETRICS AND GYNECOLOGY | Facility: CLINIC | Age: 54
End: 2024-02-08
Payer: MEDICARE

## 2024-02-08 DIAGNOSIS — F99 INSOMNIA DUE TO OTHER MENTAL DISORDER: ICD-10-CM

## 2024-02-08 DIAGNOSIS — F43.10 PTSD (POST-TRAUMATIC STRESS DISORDER): ICD-10-CM

## 2024-02-08 DIAGNOSIS — F33.3 SEVERE EPISODE OF RECURRENT MAJOR DEPRESSIVE DISORDER, WITH PSYCHOTIC FEATURES (MULTI): ICD-10-CM

## 2024-02-08 DIAGNOSIS — F51.05 INSOMNIA DUE TO OTHER MENTAL DISORDER: ICD-10-CM

## 2024-02-08 DIAGNOSIS — F41.1 GENERALIZED ANXIETY DISORDER: ICD-10-CM
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
## 2024-03-04 ENCOUNTER — OFFICE VISIT (OUTPATIENT)
Dept: PRIMARY CARE | Facility: CLINIC | Age: 54
End: 2024-03-04
Payer: MEDICARE

## 2024-03-04 VITALS
TEMPERATURE: 97.1 F | DIASTOLIC BLOOD PRESSURE: 66 MMHG | OXYGEN SATURATION: 97 % | RESPIRATION RATE: 16 BRPM | HEART RATE: 75 BPM | SYSTOLIC BLOOD PRESSURE: 95 MMHG | BODY MASS INDEX: 18.99 KG/M2 | HEIGHT: 65 IN | WEIGHT: 114 LBS

## 2024-03-04 DIAGNOSIS — M54.2 NECK PAIN, MUSCULOSKELETAL: ICD-10-CM

## 2024-03-04 DIAGNOSIS — M54.9 ACUTE BILATERAL BACK PAIN, UNSPECIFIED BACK LOCATION: ICD-10-CM

## 2024-03-04 DIAGNOSIS — Z00.00 ROUTINE GENERAL MEDICAL EXAMINATION AT HEALTH CARE FACILITY: Primary | ICD-10-CM

## 2024-03-04 DIAGNOSIS — E44.0 MALNUTRITION OF MODERATE DEGREE (MULTI): ICD-10-CM

## 2024-03-04 DIAGNOSIS — Z87.828 HISTORY OF MOTOR VEHICLE ACCIDENT: ICD-10-CM

## 2024-03-04 DIAGNOSIS — F33.3 SEVERE EPISODE OF RECURRENT MAJOR DEPRESSIVE DISORDER, WITH PSYCHOTIC FEATURES (MULTI): ICD-10-CM

## 2024-03-04 DIAGNOSIS — Z71.89 ACP (ADVANCE CARE PLANNING): ICD-10-CM

## 2024-03-04 DIAGNOSIS — M43.6 STIFFNESS OF NECK: ICD-10-CM

## 2024-03-04 DIAGNOSIS — Z13.6 SCREENING FOR CARDIOVASCULAR CONDITION: ICD-10-CM

## 2024-03-04 PROCEDURE — 99497 ADVNCD CARE PLAN 30 MIN: CPT | Performed by: STUDENT IN AN ORGANIZED HEALTH CARE EDUCATION/TRAINING PROGRAM

## 2024-03-04 PROCEDURE — G0439 PPPS, SUBSEQ VISIT: HCPCS | Performed by: STUDENT IN AN ORGANIZED HEALTH CARE EDUCATION/TRAINING PROGRAM

## 2024-03-04 PROCEDURE — 1036F TOBACCO NON-USER: CPT | Performed by: STUDENT IN AN ORGANIZED HEALTH CARE EDUCATION/TRAINING PROGRAM

## 2024-03-04 PROCEDURE — 99214 OFFICE O/P EST MOD 30 MIN: CPT | Performed by: STUDENT IN AN ORGANIZED HEALTH CARE EDUCATION/TRAINING PROGRAM

## 2024-03-04 RX ORDER — TIZANIDINE 2 MG/1
2 TABLET ORAL NIGHTLY PRN
Qty: 30 TABLET | Refills: 0 | Status: SHIPPED | OUTPATIENT
Start: 2024-03-04 | End: 2024-04-03

## 2024-03-04 SDOH — ECONOMIC STABILITY: FOOD INSECURITY: WITHIN THE PAST 12 MONTHS, THE FOOD YOU BOUGHT JUST DIDN'T LAST AND YOU DIDN'T HAVE MONEY TO GET MORE.: NEVER TRUE

## 2024-03-04 SDOH — ECONOMIC STABILITY: FOOD INSECURITY: WITHIN THE PAST 12 MONTHS, YOU WORRIED THAT YOUR FOOD WOULD RUN OUT BEFORE YOU GOT MONEY TO BUY MORE.: NEVER TRUE

## 2024-03-04 ASSESSMENT — ENCOUNTER SYMPTOMS
COUGH: 0
CONSTIPATION: 0
NAUSEA: 0
CONFUSION: 0
NECK STIFFNESS: 1
DIZZINESS: 0
UNEXPECTED WEIGHT CHANGE: 0
SHORTNESS OF BREATH: 0
WHEEZING: 0
COLOR CHANGE: 0
NECK PAIN: 1
FATIGUE: 0
LOSS OF SENSATION IN FEET: 0
DEPRESSION: 1
OCCASIONAL FEELINGS OF UNSTEADINESS: 1
HEADACHES: 0
CHILLS: 0
BACK PAIN: 1
FEVER: 0
VOMITING: 0
PALPITATIONS: 0
DIARRHEA: 0
ABDOMINAL PAIN: 0

## 2024-03-04 ASSESSMENT — LIFESTYLE VARIABLES
SKIP TO QUESTIONS 9-10: 1
HOW MANY STANDARD DRINKS CONTAINING ALCOHOL DO YOU HAVE ON A TYPICAL DAY: 1 OR 2
HOW OFTEN DO YOU HAVE SIX OR MORE DRINKS ON ONE OCCASION: NEVER
HOW OFTEN DO YOU HAVE A DRINK CONTAINING ALCOHOL: MONTHLY OR LESS
AUDIT-C TOTAL SCORE: 1

## 2024-03-04 ASSESSMENT — PATIENT HEALTH QUESTIONNAIRE - PHQ9
10. IF YOU CHECKED OFF ANY PROBLEMS, HOW DIFFICULT HAVE THESE PROBLEMS MADE IT FOR YOU TO DO YOUR WORK, TAKE CARE OF THINGS AT HOME, OR GET ALONG WITH OTHER PEOPLE: VERY DIFFICULT
1. LITTLE INTEREST OR PLEASURE IN DOING THINGS: SEVERAL DAYS
2. FEELING DOWN, DEPRESSED OR HOPELESS: SEVERAL DAYS
SUM OF ALL RESPONSES TO PHQ9 QUESTIONS 1 & 2: 2

## 2024-03-04 ASSESSMENT — ACTIVITIES OF DAILY LIVING (ADL)
MANAGING_FINANCES: NEEDS ASSISTANCE
BATHING: INDEPENDENT
DOING_HOUSEWORK: NEEDS ASSISTANCE
TAKING_MEDICATION: INDEPENDENT
GROCERY_SHOPPING: NEEDS ASSISTANCE
DRESSING: INDEPENDENT
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
## 2024-03-04 NOTE — PATIENT INSTRUCTIONS

## 2024-03-07 ENCOUNTER — APPOINTMENT (OUTPATIENT)
Dept: BEHAVIORAL HEALTH | Facility: CLINIC | Age: 54
End: 2024-03-07
Payer: MEDICARE

## 2024-03-07 DIAGNOSIS — F33.3 SEVERE EPISODE OF RECURRENT MAJOR DEPRESSIVE DISORDER, WITH PSYCHOTIC FEATURES (MULTI): ICD-10-CM

## 2024-03-07 PROCEDURE — 99214 OFFICE O/P EST MOD 30 MIN: CPT | Performed by: STUDENT IN AN ORGANIZED HEALTH CARE EDUCATION/TRAINING PROGRAM

## 2024-03-07 PROCEDURE — 99214 OFFICE O/P EST MOD 30 MIN: CPT | Mod: AM | Performed by: STUDENT IN AN ORGANIZED HEALTH CARE EDUCATION/TRAINING PROGRAM

## 2024-03-14 ENCOUNTER — EVALUATION (OUTPATIENT)
Dept: PHYSICAL THERAPY | Facility: HOSPITAL | Age: 54
End: 2024-03-14
Payer: MEDICARE

## 2024-03-14 DIAGNOSIS — Z87.828 HISTORY OF MOTOR VEHICLE ACCIDENT: ICD-10-CM

## 2024-03-14 DIAGNOSIS — M54.9 ACUTE BILATERAL BACK PAIN, UNSPECIFIED BACK LOCATION: ICD-10-CM

## 2024-03-14 DIAGNOSIS — M54.2 NECK PAIN, MUSCULOSKELETAL: ICD-10-CM

## 2024-03-14 DIAGNOSIS — M43.6 STIFFNESS OF NECK: Primary | ICD-10-CM

## 2024-03-14 PROCEDURE — 97161 PT EVAL LOW COMPLEX 20 MIN: CPT | Mod: GP | Performed by: PHYSICAL THERAPIST

## 2024-03-14 PROCEDURE — 97110 THERAPEUTIC EXERCISES: CPT | Mod: GP | Performed by: PHYSICAL THERAPIST

## 2024-03-14 ASSESSMENT — ENCOUNTER SYMPTOMS
LOSS OF SENSATION IN FEET: 0
OCCASIONAL FEELINGS OF UNSTEADINESS: 1

## 2024-03-14 ASSESSMENT — PAIN - FUNCTIONAL ASSESSMENT: PAIN_FUNCTIONAL_ASSESSMENT: 0-10

## 2024-03-14 ASSESSMENT — PAIN SCALES - GENERAL: PAINLEVEL_OUTOF10: 4
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
## 2024-03-21 ENCOUNTER — TREATMENT (OUTPATIENT)
Dept: PHYSICAL THERAPY | Facility: HOSPITAL | Age: 54
End: 2024-03-21
Payer: MEDICARE

## 2024-03-21 DIAGNOSIS — M54.9 ACUTE BILATERAL BACK PAIN, UNSPECIFIED BACK LOCATION: ICD-10-CM

## 2024-03-21 DIAGNOSIS — M54.2 NECK PAIN, MUSCULOSKELETAL: Primary | ICD-10-CM

## 2024-03-21 DIAGNOSIS — M43.6 STIFFNESS OF NECK: ICD-10-CM

## 2024-03-21 DIAGNOSIS — Z87.828 HISTORY OF MOTOR VEHICLE ACCIDENT: ICD-10-CM

## 2024-03-21 PROCEDURE — 97110 THERAPEUTIC EXERCISES: CPT | Mod: GP,CQ

## 2024-03-21 ASSESSMENT — PAIN - FUNCTIONAL ASSESSMENT: PAIN_FUNCTIONAL_ASSESSMENT: 0-10

## 2024-03-21 ASSESSMENT — PAIN SCALES - GENERAL: PAINLEVEL_OUTOF10: 0 - NO PAIN
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
## 2024-03-27 ENCOUNTER — TREATMENT (OUTPATIENT)
Dept: PHYSICAL THERAPY | Facility: HOSPITAL | Age: 54
End: 2024-03-27
Payer: MEDICARE

## 2024-03-27 DIAGNOSIS — M54.2 NECK PAIN, MUSCULOSKELETAL: ICD-10-CM

## 2024-03-27 DIAGNOSIS — M43.6 STIFFNESS OF NECK: Primary | ICD-10-CM

## 2024-03-27 DIAGNOSIS — M54.9 ACUTE BILATERAL BACK PAIN, UNSPECIFIED BACK LOCATION: ICD-10-CM

## 2024-03-27 DIAGNOSIS — Z87.828 HISTORY OF MOTOR VEHICLE ACCIDENT: ICD-10-CM

## 2024-03-27 PROCEDURE — 97140 MANUAL THERAPY 1/> REGIONS: CPT | Mod: GP,CQ

## 2024-03-27 PROCEDURE — 97110 THERAPEUTIC EXERCISES: CPT | Mod: GP,CQ

## 2024-03-27 ASSESSMENT — PAIN - FUNCTIONAL ASSESSMENT: PAIN_FUNCTIONAL_ASSESSMENT: 0-10

## 2024-03-27 ASSESSMENT — PAIN SCALES - GENERAL: PAINLEVEL_OUTOF10: 3
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
## 2024-03-29 ENCOUNTER — TREATMENT (OUTPATIENT)
Dept: PHYSICAL THERAPY | Facility: HOSPITAL | Age: 54
End: 2024-03-29
Payer: MEDICARE

## 2024-03-29 DIAGNOSIS — M54.2 NECK PAIN, MUSCULOSKELETAL: ICD-10-CM

## 2024-03-29 DIAGNOSIS — Z87.828 HISTORY OF MOTOR VEHICLE ACCIDENT: ICD-10-CM

## 2024-03-29 DIAGNOSIS — M43.6 STIFFNESS OF NECK: Primary | ICD-10-CM

## 2024-03-29 DIAGNOSIS — M54.9 ACUTE BILATERAL BACK PAIN, UNSPECIFIED BACK LOCATION: ICD-10-CM

## 2024-03-29 PROCEDURE — 97110 THERAPEUTIC EXERCISES: CPT | Mod: GP,CQ

## 2024-03-29 PROCEDURE — 97140 MANUAL THERAPY 1/> REGIONS: CPT | Mod: GP,CQ

## 2024-03-29 ASSESSMENT — PAIN SCALES - GENERAL: PAINLEVEL_OUTOF10: 5 - MODERATE PAIN

## 2024-03-29 ASSESSMENT — PAIN - FUNCTIONAL ASSESSMENT: PAIN_FUNCTIONAL_ASSESSMENT: 0-10
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
## 2024-04-01 DIAGNOSIS — M54.2 NECK PAIN, MUSCULOSKELETAL: ICD-10-CM

## 2024-04-01 DIAGNOSIS — M43.6 STIFFNESS OF NECK: ICD-10-CM

## 2024-04-02 RX ORDER — OMEGA-3-ACID ETHYL ESTERS 1 G/1
1 CAPSULE, LIQUID FILLED ORAL DAILY
COMMUNITY

## 2024-04-03 ENCOUNTER — DOCUMENTATION (OUTPATIENT)
Dept: PHYSICAL THERAPY | Facility: HOSPITAL | Age: 54
End: 2024-04-03
Payer: MEDICARE

## 2024-04-03 RX ORDER — TIZANIDINE 2 MG/1
2 TABLET ORAL NIGHTLY PRN
Qty: 30 TABLET | Refills: 0 | Status: SHIPPED | OUTPATIENT
Start: 2024-04-03 | End: 2024-05-03

## 2024-04-03 NOTE — PROGRESS NOTES
Physical Therapy                 Therapy Communication Note    Patient Name: Dolores Salgado  MRN: [id number]  Today's Date: 4/3/2024     Discipline: Physical Therapy    Missed Visit Reason:      Missed Time: No Show    Comment: left voicemail

## 2024-04-05 ENCOUNTER — TREATMENT (OUTPATIENT)
Dept: PHYSICAL THERAPY | Facility: HOSPITAL | Age: 54
End: 2024-04-05
Payer: MEDICARE

## 2024-04-05 DIAGNOSIS — M54.2 NECK PAIN, MUSCULOSKELETAL: ICD-10-CM

## 2024-04-05 DIAGNOSIS — Z87.828 HISTORY OF MOTOR VEHICLE ACCIDENT: ICD-10-CM

## 2024-04-05 DIAGNOSIS — M54.9 ACUTE BILATERAL BACK PAIN, UNSPECIFIED BACK LOCATION: ICD-10-CM

## 2024-04-05 DIAGNOSIS — M43.6 STIFFNESS OF NECK: ICD-10-CM

## 2024-04-05 PROCEDURE — 97012 MECHANICAL TRACTION THERAPY: CPT | Mod: GP,CQ | Performed by: PHYSICAL THERAPY ASSISTANT

## 2024-04-05 PROCEDURE — 97110 THERAPEUTIC EXERCISES: CPT | Mod: GP,CQ | Performed by: PHYSICAL THERAPY ASSISTANT

## 2024-04-05 PROCEDURE — 97140 MANUAL THERAPY 1/> REGIONS: CPT | Mod: 59,GP,CQ | Performed by: PHYSICAL THERAPY ASSISTANT

## 2024-04-05 ASSESSMENT — PAIN - FUNCTIONAL ASSESSMENT: PAIN_FUNCTIONAL_ASSESSMENT: 0-10

## 2024-04-05 ASSESSMENT — PAIN SCALES - GENERAL: PAINLEVEL_OUTOF10: 1
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
## 2024-04-09 ENCOUNTER — APPOINTMENT (OUTPATIENT)
Dept: PHYSICAL THERAPY | Facility: HOSPITAL | Age: 54
End: 2024-04-09
Payer: MEDICARE

## 2024-04-10 ENCOUNTER — TREATMENT (OUTPATIENT)
Dept: PHYSICAL THERAPY | Facility: HOSPITAL | Age: 54
End: 2024-04-10
Payer: MEDICARE

## 2024-04-10 DIAGNOSIS — M54.2 NECK PAIN, MUSCULOSKELETAL: ICD-10-CM

## 2024-04-10 DIAGNOSIS — Z87.828 HISTORY OF MOTOR VEHICLE ACCIDENT: ICD-10-CM

## 2024-04-10 DIAGNOSIS — M43.6 STIFFNESS OF NECK: ICD-10-CM

## 2024-04-10 DIAGNOSIS — M54.9 ACUTE BILATERAL BACK PAIN, UNSPECIFIED BACK LOCATION: ICD-10-CM

## 2024-04-10 PROCEDURE — 97110 THERAPEUTIC EXERCISES: CPT | Mod: GP,CQ | Performed by: PHYSICAL THERAPY ASSISTANT

## 2024-04-10 PROCEDURE — 97012 MECHANICAL TRACTION THERAPY: CPT | Mod: GP,CQ | Performed by: PHYSICAL THERAPY ASSISTANT

## 2024-04-10 PROCEDURE — 97140 MANUAL THERAPY 1/> REGIONS: CPT | Mod: 59,GP,CQ | Performed by: PHYSICAL THERAPY ASSISTANT

## 2024-04-10 ASSESSMENT — PAIN - FUNCTIONAL ASSESSMENT: PAIN_FUNCTIONAL_ASSESSMENT: 0-10

## 2024-04-10 ASSESSMENT — PAIN SCALES - GENERAL: PAINLEVEL_OUTOF10: 0 - NO PAIN
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
## 2024-04-11 ENCOUNTER — DOCUMENTATION (OUTPATIENT)
Dept: PHYSICAL THERAPY | Facility: HOSPITAL | Age: 54
End: 2024-04-11
Payer: MEDICARE

## 2024-04-11 ENCOUNTER — APPOINTMENT (OUTPATIENT)
Dept: PHYSICAL THERAPY | Facility: HOSPITAL | Age: 54
End: 2024-04-11
Payer: MEDICARE

## 2024-04-11 NOTE — PROGRESS NOTES
Physical Therapy                 Therapy Communication Note    Patient Name: Dolores Salgado  MRN: [id number]  Today's Date: 4/11/2024     Discipline: Physical Therapy    Missed Visit Reason:  Cancel    Missed Time: Cancel    Comment: Patient called at time of visit to cancel, stating she forgot about it and would reschedule.

## 2024-04-23 ENCOUNTER — HOSPITAL ENCOUNTER (EMERGENCY)
Facility: HOSPITAL | Age: 54
Discharge: HOME | End: 2024-04-23
Payer: MEDICARE

## 2024-04-23 ENCOUNTER — PHARMACY VISIT (OUTPATIENT)
Dept: PHARMACY | Facility: CLINIC | Age: 54
End: 2024-04-23
Payer: COMMERCIAL

## 2024-04-23 ENCOUNTER — APPOINTMENT (OUTPATIENT)
Dept: RADIOLOGY | Facility: HOSPITAL | Age: 54
End: 2024-04-23
Payer: MEDICARE

## 2024-04-23 VITALS
HEIGHT: 65 IN | BODY MASS INDEX: 19.16 KG/M2 | RESPIRATION RATE: 16 BRPM | SYSTOLIC BLOOD PRESSURE: 120 MMHG | TEMPERATURE: 97.2 F | OXYGEN SATURATION: 96 % | DIASTOLIC BLOOD PRESSURE: 75 MMHG | WEIGHT: 115 LBS | HEART RATE: 85 BPM

## 2024-04-23 DIAGNOSIS — J06.9 UPPER RESPIRATORY TRACT INFECTION, UNSPECIFIED TYPE: Primary | ICD-10-CM

## 2024-04-23 LAB
FLUAV RNA RESP QL NAA+PROBE: NOT DETECTED
FLUBV RNA RESP QL NAA+PROBE: NOT DETECTED
RSV RNA RESP QL NAA+PROBE: NOT DETECTED
SARS-COV-2 RNA RESP QL NAA+PROBE: NOT DETECTED

## 2024-04-23 PROCEDURE — 99283 EMERGENCY DEPT VISIT LOW MDM: CPT | Mod: 25 | Performed by: NURSE PRACTITIONER

## 2024-04-23 PROCEDURE — 71046 X-RAY EXAM CHEST 2 VIEWS: CPT

## 2024-04-23 PROCEDURE — 87637 SARSCOV2&INF A&B&RSV AMP PRB: CPT | Performed by: NURSE PRACTITIONER

## 2024-04-23 PROCEDURE — 2500000005 HC RX 250 GENERAL PHARMACY W/O HCPCS: Performed by: NURSE PRACTITIONER

## 2024-04-23 PROCEDURE — 71046 X-RAY EXAM CHEST 2 VIEWS: CPT | Performed by: RADIOLOGY

## 2024-04-23 PROCEDURE — RXMED WILLOW AMBULATORY MEDICATION CHARGE

## 2024-04-23 RX ORDER — ALBUTEROL SULFATE 90 UG/1
2 AEROSOL, METERED RESPIRATORY (INHALATION) EVERY 4 HOURS PRN
Qty: 8.5 G | Refills: 0 | Status: SHIPPED | OUTPATIENT
Start: 2024-04-23

## 2024-04-23 RX ORDER — ONDANSETRON 4 MG/1
4 TABLET, FILM COATED ORAL 3 TIMES DAILY
Qty: 10 TABLET | Refills: 0 | Status: SHIPPED | OUTPATIENT
Start: 2024-04-23

## 2024-04-23 RX ORDER — GUAIFENESIN AND PSEUDOEPHEDRINE HCL 1200; 120 MG/1; MG/1
1 TABLET, EXTENDED RELEASE ORAL 2 TIMES DAILY
Qty: 24 TABLET | Refills: 0 | Status: SHIPPED | OUTPATIENT
Start: 2024-04-23

## 2024-04-23 RX ORDER — ONDANSETRON 4 MG/1
4 TABLET, ORALLY DISINTEGRATING ORAL ONCE
Status: COMPLETED | OUTPATIENT
Start: 2024-04-23 | End: 2024-04-23

## 2024-04-23 RX ADMIN — ONDANSETRON 4 MG: 4 TABLET, ORALLY DISINTEGRATING ORAL at 14:01

## 2024-04-23 ASSESSMENT — PAIN DESCRIPTION - PAIN TYPE: TYPE: ACUTE PAIN

## 2024-04-23 ASSESSMENT — PAIN - FUNCTIONAL ASSESSMENT: PAIN_FUNCTIONAL_ASSESSMENT: 0-10

## 2024-04-23 ASSESSMENT — PAIN SCALES - GENERAL: PAINLEVEL_OUTOF10: 3

## 2024-04-23 NOTE — ED PROVIDER NOTES
Chief Complaint   Patient presents with   • dental  pain/ flu sx       HPI       53 year old female presents to the Emergency Department today complaining of a 3 day history of fatigue, body aches, nasal congestion, postnasal drip, and a nonproductive cough. Has felt hot and col, but has had no documented fever. Denies any associated headache, neck pain, chest pain, shortness of breath, abdominal pain, nausea, vomiting, diarrhea, constipation, or urinary symptoms.       History provided by:  Patient             Patient History   Past Medical History:   Diagnosis Date   • Change in bowel habits 11/05/2023   • Diarrhea 10/09/2023   • Epigastric pain 04/12/2023   • Incontinence of feces 11/05/2023   • Rectal bleeding 08/22/2023     Past Surgical History:   Procedure Laterality Date   • TONSILLECTOMY  04/12/2018    Tonsillectomy     Family History   Problem Relation Name Age of Onset   • Lymphoma Mother     • Coronary artery disease Father     • Alzheimer's disease Maternal Grandmother     • Parkinsonism Maternal Grandfather       Social History     Tobacco Use   • Smoking status: Never     Passive exposure: Never   • Smokeless tobacco: Never   Vaping Use   • Vaping status: Some Days   • Substances: THC, CBD   • Devices: Disposable   Substance Use Topics   • Alcohol use: Yes     Alcohol/week: 3.0 standard drinks of alcohol     Types: 3 Glasses of wine per week   • Drug use: Yes     Types: Marijuana           Physical Exam  Constitutional:       Appearance: Normal appearance.   HENT:      Head: Normocephalic.      Right Ear: Tympanic membrane, ear canal and external ear normal.      Left Ear: Tympanic membrane, ear canal and external ear normal.      Nose: Nose normal.      Mouth/Throat:      Lips: Pink.      Mouth: Mucous membranes are moist.      Dentition: No dental caries.      Pharynx: Oropharynx is clear. Uvula midline. No oropharyngeal exudate or posterior oropharyngeal erythema.      Tonsils: No tonsillar  exudate. 1+ on the right. 1+ on the left.   Eyes:      Conjunctiva/sclera: Conjunctivae normal.      Pupils: Pupils are equal, round, and reactive to light.   Cardiovascular:      Rate and Rhythm: Normal rate and regular rhythm.      Heart sounds: No murmur heard.     No friction rub. No gallop.   Pulmonary:      Effort: Pulmonary effort is normal. No respiratory distress.      Breath sounds: Normal breath sounds. No stridor. No wheezing, rhonchi or rales.   Neurological:      Mental Status: She is alert.         Labs Reviewed   SARS-COV-2 PCR - Normal       Result Value    Coronavirus 2019, PCR Not Detected      Narrative:     This assay has received FDA Emergency Use Authorization (EUA) and is only authorized for the duration of time that circumstances exist to justify the authorization of the emergency use of in vitro diagnostic tests for the detection of SARS-CoV-2 virus and/or diagnosis of COVID-19 infection under section 564(b)(1) of the Act, 21 U.S.C. 360bbb-3(b)(1). This assay is an in vitro diagnostic nucleic acid amplification test for the qualitative detection of SARS-CoV-2 from nasopharyngeal specimens and has been validated for use at [facility]. Negative results do not preclude COVID-19 infections and should not be used as the sole basis for diagnosis, treatment, or other management decisions.     INFLUENZA A AND B PCR - Normal    Flu A Result Not Detected      Flu B Result Not Detected      Narrative:     This assay is an in vitro diagnostic multiplex nucleic acid amplification test for the detection and discrimination of Influenza A & B from nasopharyngeal specimens, and has been validated for use at [facility]. Negative results do not preclude Influenza A/B infections, and should not be used as the sole basis for diagnosis, treatment, or other management decisions. If Influenza A/B and RSV PCR results are negative, testing for Parainfluenza virus,  Adenovirus and Metapneumovirus is routinely performed for [facility] pediatric oncology and intensive care inpatients, and is available on other patients by placing an add-on request.   RSV PCR - Normal    RSV PCR Not Detected      Narrative:     This assay is an FDA-cleared, in vitro diagnostic nucleic acid amplification test for the detection of RSV from nasopharyngeal specimens, and has been validated for use at [facility]. Negative results do not preclude RSV infections, and should not be used as the sole basis for diagnosis, treatment, or other management decisions. If Influenza A/B and RSV PCR results are negative, testing for Parainfluenza virus, Adenovirus and Metapneumovirus is routinely performed for pediatric oncology and intensive care inpatients at [facility], and is available on other patients by placing an add-on request.           XR chest 2 views   Final Result   No active cardiopulmonary disease.             MACRO:   None        Signed by: Blanca Conrad 4/23/2024 3:03 PM   Dictation workstation:   [id number]               ED Course & MDM   Diagnoses as of 04/23/24 1523   Upper respiratory tract infection, unspecified type           Medical Decision Making  Patient was seen and evaluated by myself. Influenza, RSV, and COVID were all negative. CXR showed no acute pathology. At this time, I feel that they have an acute viral URI. Therefore, antibiotics are not clinically indicated. Given prescriptions for Mucinex plus D and an Albuterol inhaler. Follow up with their doctor in 3 days. Return if worse in any way. Discharged in stable condition with computer instructions.    Diagnostic Impression:     1. Acute viral URI    2. Prescription therapy            Your medication list        ASK your doctor about these medications        Instructions Last Dose Given Next Dose Due   aspirin 81 mg EC tablet           Auvelity  mg tablet, IR and ER, biphasic  Generic drug:  dextromethorphan-bupropion           b complex vitamins capsule           buPROPion 100 mg tablet  Commonly known as: Wellbutrin      Take 1 tablet (100 mg) by mouth once every day for 14 days.       cholecalciferol 25 MCG (1000 UT) tablet  Commonly known as: Vitamin D-3           clonazePAM 1 mg tablet  Commonly known as: KlonoPIN           cyanocobalamin (vitamin B-12) 5,000 mcg capsule           DULoxetine 30 mg DR capsule  Commonly known as: Cymbalta      Take 2 capsules (60 mg) by mouth once daily.       Fish OiL 1,200 (144-216) mg capsule  Generic drug: omega 3-dha-epa-fish oil           ibuprofen 800 mg tablet           levothyroxine 25 mcg tablet  Commonly known as: Synthroid, Levoxyl      Take 1 tablet (25 mcg) by mouth once daily in the morning.       omega-3 acid ethyl esters 1 gram capsule  Commonly known as: Lovaza           One Daily Multivitamin 400 mcg tablet  Generic drug: multivitamin           SACCHAROMYCES BOULARDII ORAL           tiZANidine 2 mg tablet  Commonly known as: Zanaflex      TAKE 1 TABLET (2 MG) BY MOUTH AS NEEDED AT BEDTIME FOR MUSCLE SPASMS.       traZODone 100 mg tablet  Commonly known as: Desyrel      TAKE 1 TO 2 TABLETS AT BEDTIME AS NEEDED FOR INSOMNIA                  Procedure  Procedures     Erik Sierra, DANISH-CNP  04/23/24 3061

## 2024-05-06 ENCOUNTER — TREATMENT (OUTPATIENT)
Dept: PHYSICAL THERAPY | Facility: HOSPITAL | Age: 54
End: 2024-05-06
Payer: MEDICARE

## 2024-05-06 DIAGNOSIS — M54.9 ACUTE BILATERAL BACK PAIN, UNSPECIFIED BACK LOCATION: ICD-10-CM

## 2024-05-06 DIAGNOSIS — Z87.828 HISTORY OF MOTOR VEHICLE ACCIDENT: ICD-10-CM

## 2024-05-06 DIAGNOSIS — M54.2 NECK PAIN, MUSCULOSKELETAL: ICD-10-CM

## 2024-05-06 DIAGNOSIS — M43.6 STIFFNESS OF NECK: Primary | ICD-10-CM

## 2024-05-06 PROCEDURE — 97110 THERAPEUTIC EXERCISES: CPT | Mod: GP | Performed by: PHYSICAL THERAPIST

## 2024-05-06 PROCEDURE — 97012 MECHANICAL TRACTION THERAPY: CPT | Mod: GP | Performed by: PHYSICAL THERAPIST

## 2024-05-06 PROCEDURE — 97530 THERAPEUTIC ACTIVITIES: CPT | Mod: GP | Performed by: PHYSICAL THERAPIST

## 2024-05-06 ASSESSMENT — PAIN - FUNCTIONAL ASSESSMENT: PAIN_FUNCTIONAL_ASSESSMENT: 0-10

## 2024-05-06 ASSESSMENT — PAIN SCALES - GENERAL: PAINLEVEL_OUTOF10: 0 - NO PAIN

## 2024-05-06 ASSESSMENT — PAIN DESCRIPTION - DESCRIPTORS: DESCRIPTORS: TIGHTNESS
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
## 2024-05-21 ENCOUNTER — TREATMENT (OUTPATIENT)
Dept: PHYSICAL THERAPY | Facility: HOSPITAL | Age: 54
End: 2024-05-21
Payer: MEDICARE

## 2024-05-21 DIAGNOSIS — M54.2 NECK PAIN, MUSCULOSKELETAL: ICD-10-CM

## 2024-05-21 DIAGNOSIS — M43.6 STIFFNESS OF NECK: Primary | ICD-10-CM

## 2024-05-21 DIAGNOSIS — M54.9 ACUTE BILATERAL BACK PAIN, UNSPECIFIED BACK LOCATION: ICD-10-CM

## 2024-05-21 DIAGNOSIS — Z87.828 HISTORY OF MOTOR VEHICLE ACCIDENT: ICD-10-CM

## 2024-05-21 PROCEDURE — 97140 MANUAL THERAPY 1/> REGIONS: CPT | Mod: GP | Performed by: PHYSICAL THERAPIST

## 2024-05-21 PROCEDURE — 97530 THERAPEUTIC ACTIVITIES: CPT | Mod: GP | Performed by: PHYSICAL THERAPIST

## 2024-05-21 ASSESSMENT — PAIN - FUNCTIONAL ASSESSMENT: PAIN_FUNCTIONAL_ASSESSMENT: 0-10

## 2024-05-21 ASSESSMENT — PAIN SCALES - GENERAL: PAINLEVEL_OUTOF10: 4
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
## 2024-05-23 ENCOUNTER — APPOINTMENT (OUTPATIENT)
Dept: NEUROLOGY | Facility: CLINIC | Age: 54
End: 2024-05-23
Payer: MEDICARE

## 2024-05-30 ENCOUNTER — OFFICE VISIT (OUTPATIENT)
Dept: BEHAVIORAL HEALTH | Facility: CLINIC | Age: 54
End: 2024-05-30
Payer: MEDICARE

## 2024-05-30 DIAGNOSIS — F33.3 SEVERE EPISODE OF RECURRENT MAJOR DEPRESSIVE DISORDER, WITH PSYCHOTIC FEATURES (MULTI): ICD-10-CM

## 2024-05-30 DIAGNOSIS — F41.1 GENERALIZED ANXIETY DISORDER: ICD-10-CM

## 2024-05-30 PROCEDURE — 99214 OFFICE O/P EST MOD 30 MIN: CPT | Mod: AM | Performed by: STUDENT IN AN ORGANIZED HEALTH CARE EDUCATION/TRAINING PROGRAM

## 2024-05-30 PROCEDURE — 99214 OFFICE O/P EST MOD 30 MIN: CPT | Performed by: STUDENT IN AN ORGANIZED HEALTH CARE EDUCATION/TRAINING PROGRAM

## 2024-05-30 NOTE — PROGRESS NOTES
Subjective    All Individuals Present: Patient and Provider (Encounter Provider)     ID: Dolores Salgado is a 53 y.o. female with history of severe MDD and PTSD.     Interval History/HPI/PFSH:  Feels like she is making some headway in therapy. Starting to have some concept of a future and trying to explore what that may look like. Trying to connect with people. Doing more trauma work in therapy and this has dredged up some hypervigilance.     Denies any current HI, AVH.      Medication side effects:  sedation , sexual dysfunction     Review of Systems  Constitutional: Positive for fatigue, Negative for fevers  Psychiatric: Positive for anxiety, decreased appetite, depression, fatigue, irritability, learning difficulty, loss of interest in favorite activities, mood swings, and sleep disturbance, Negative for aggressive behavior, excessive alcohol consumption, illegal drug usage, and tobacco use  Neurological: Positive for memory problems, Negative for gait problems, headaches, seizures, speech problems, and weakness   Other: vaginismus    Objective   There were no vitals taken for this visit.  Wt Readings from Last 4 Encounters:   04/23/24 52.2 kg (115 lb)   03/04/24 51.7 kg (114 lb)   02/01/24 53.1 kg (117 lb)   01/10/24 54.4 kg (120 lb)       Mental Status Exam  General Appearance: Fairly groomed.  Attitude/Behavior: Cooperative, conversant, engaged, and with good eye contact.  Motor: Psychomotor retardation.  Speech: Slow speech  Gait/Station: Within normal limits  Mood: depressed.  Affect: Dysphoric, constricted  and Congruent with mood and topic of conversation  Thought Process: Linear, goal directed, Perseverative  Thought Associations: No loosening of associations  Thought Content:  chronic SI  Sensorium: Alert and oriented to person, place, time and situation  Insight: Intact, as evidenced by awareness of symptom patterns  Judgment: Intact  Cognition: Attention: Mild impairment in attention, Fund of Knowledge:  Good, Language: Word finding difficulties, and Orientation: Ox4  Testing:  see prior neuropsychology/neuropsychiatry notes.    Laboratory/Imaging/Diagnostic Tests       Assessment/Plan   Overall Formulation and Differential Diagnosis:  Dolores Salgado is a 53 y.o. female who meets criteria for severe MDD and PTSD.  Interval Assessment:  History of MDD for years with significant worsening depressed mood, especially secondary to grief of partner committing suicide 2020. Has had numerous psychotropic trials, recently started on vilazodone 10 mg, recently on vortioxetine. Has also been placed on Li 600 mg as depression augmentation given SI, but decision to not increase further related to current poor PO intake related to depression/neurovegatitve status and diminished IADLs. Has had multiple rounds of ECT on two separate occasions with no discernible benefit. Current SI passive, no plan/intent, able to contract for safety.     *In the interim, mood no longer dipping. Having adverse sexual side effects from medications, working towards simplification. Trying to consolidate medications to help reduce side effect profile, while monitoring to make sure no exacerbation in mood. Able to contract for safety.     Dx:  -MDD, recurrent, severe, with psychotic features  -PTSD  -illness anxiety disorder  -complicated bereavement  -r/o cognitive dysfunction d/t vascular etiology     Plan:  -continue duloxetine 60 mg PO daily; consider dose alteration re: side effects  -continue Auvelity  mg PO daily  -Li discontinued d/t toxicity  -continue clonazepam to 1 mg 1-2x/day prn panic  -continue propranolol ER 80 mg PO daily  -completed esketamine  -continue individual therapy  -RTC 1-2 week     Risk Assessment:  Imminent Risk of Suicide or Serious Self-Injury: Medium Risk - Risk factors include: {Depression, History of suicide attempt , History of trauma or abuse , Local clusters of suicide or exposure to others who have  by  suicide , Loss (relational, social, occupational, financial) , Medical illness comorbidity , Sense of isolation , Severe anxiety, and Suicidal ideation , Protective Factors include: Future-oriented talk , Willingness to seek help and support , Skills in problem solving, conflict resolution, and nonviolent handling of disputes, Access to a variety of clinical interventions , Receiving and engaged in care for mental, physical, and substance use disorders , History of adhering to treatment recommendations and/or prescribed medication regimen , Support through ongoing medical and mental healthcare relationships , and Restricted access to firearms or other lethal means of suicide   Imminent Risk of Violence or Homicide: Low Risk - Risk factors include: No significant risk factors identified on screening. Protective factors include: Lack of known history of harm to others , Lack of known history of violent ideation , Lack of known access to firearms , and Interpersonal competence   Treatment Plan:  There are no recently modified care plans to display for this patient.      Attestation Statements   Number of Minutes Spent Performing Evaluation & Management (E&M): 30

## 2024-06-05 RX ORDER — TRAZODONE HYDROCHLORIDE 100 MG/1
100-200 TABLET ORAL NIGHTLY PRN
Qty: 180 TABLET | Refills: 3 | Status: SHIPPED | OUTPATIENT
Start: 2024-06-05
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
## 2024-06-27 ENCOUNTER — OFFICE VISIT (OUTPATIENT)
Dept: BEHAVIORAL HEALTH | Facility: CLINIC | Age: 54
End: 2024-06-27
Payer: MEDICARE

## 2024-06-27 ENCOUNTER — DOCUMENTATION (OUTPATIENT)
Dept: PHYSICAL THERAPY | Facility: HOSPITAL | Age: 54
End: 2024-06-27

## 2024-06-27 DIAGNOSIS — F33.3 SEVERE EPISODE OF RECURRENT MAJOR DEPRESSIVE DISORDER, WITH PSYCHOTIC FEATURES (MULTI): ICD-10-CM

## 2024-06-27 DIAGNOSIS — F41.1 GENERALIZED ANXIETY DISORDER: ICD-10-CM

## 2024-06-27 PROCEDURE — 99214 OFFICE O/P EST MOD 30 MIN: CPT | Performed by: STUDENT IN AN ORGANIZED HEALTH CARE EDUCATION/TRAINING PROGRAM

## 2024-06-27 PROCEDURE — 99214 OFFICE O/P EST MOD 30 MIN: CPT | Mod: AM | Performed by: STUDENT IN AN ORGANIZED HEALTH CARE EDUCATION/TRAINING PROGRAM

## 2024-06-27 NOTE — PROGRESS NOTES
Subjective    All Individuals Present: Patient and Provider (Encounter Provider)     ID: Dolores Salgado is a 54 y.o. female with history of severe MDD and PTSD.     Interval History/HPI/PFSH:  Better able to address some of the hypervigilance.    Remains with dense neurovegetative symptoms, no current SI.     Denies any current HI, AVH.      Medication side effects:  sedation , sexual dysfunction     Review of Systems  Constitutional: Positive for fatigue, Negative for fevers  Psychiatric: Positive for anxiety, decreased appetite, depression, fatigue, irritability, learning difficulty, loss of interest in favorite activities, mood swings, and sleep disturbance, Negative for aggressive behavior, excessive alcohol consumption, illegal drug usage, and tobacco use  Neurological: Positive for memory problems, Negative for gait problems, headaches, seizures, speech problems, and weakness   Other: vaginismus    Objective   There were no vitals taken for this visit.  Wt Readings from Last 4 Encounters:   04/23/24 52.2 kg (115 lb)   03/04/24 51.7 kg (114 lb)   02/01/24 53.1 kg (117 lb)   01/10/24 54.4 kg (120 lb)       Mental Status Exam  General Appearance: Fairly groomed.  Attitude/Behavior: Cooperative, conversant, engaged, and with good eye contact.  Motor: Psychomotor retardation.  Speech: Slow speech  Gait/Station: Within normal limits  Mood: depressed.  Affect: Dysphoric, constricted  and Congruent with mood and topic of conversation  Thought Process: Linear, goal directed, Perseverative  Thought Associations: No loosening of associations  Thought Content:  chronic SI  Sensorium: Alert and oriented to person, place, time and situation  Insight: Intact, as evidenced by awareness of symptom patterns  Judgment: Intact  Cognition: Attention: Mild impairment in attention, Fund of Knowledge: Good, Language: Word finding difficulties, and Orientation: Ox4  Testing:  see prior neuropsychology/neuropsychiatry  notes.    Laboratory/Imaging/Diagnostic Tests       Assessment/Plan   Overall Formulation and Differential Diagnosis:  Dolores Salgado is a 54 y.o. female who meets criteria for severe MDD and PTSD.  Interval Assessment:  History of MDD for years with significant worsening depressed mood, especially secondary to grief of partner committing suicide 2020. Has had numerous psychotropic trials, recently started on vilazodone 10 mg, recently on vortioxetine. Has also been placed on Li 600 mg as depression augmentation given SI, but decision to not increase further related to current poor PO intake related to depression/neurovegatitve status and diminished IADLs. Has had multiple rounds of ECT on two separate occasions with no discernible benefit. Current SI passive, no plan/intent, able to contract for safety.     *In the interim, mood no longer dipping. Having adverse sexual side effects from medications, working towards simplification. Trying to consolidate medications to help reduce side effect profile, while monitoring to make sure no exacerbation in mood. Able to contract for safety.     Dx:  -MDD, recurrent, severe, with psychotic features  -PTSD  -illness anxiety disorder  -complicated bereavement  -r/o cognitive dysfunction d/t vascular etiology     Plan:  -continue duloxetine 60 mg PO daily; consider dose alteration re: side effects  -continue Auvelity  mg PO daily  -Li discontinued d/t toxicity  -continue clonazepam to 1 mg 1-2x/day prn panic  -continue propranolol ER 80 mg PO daily  -completed esketamine  -continue individual therapy  -RTC 4 week     Risk Assessment:  Imminent Risk of Suicide or Serious Self-Injury: Medium Risk - Risk factors include: {Depression, History of suicide attempt , History of trauma or abuse , Local clusters of suicide or exposure to others who have  by suicide , Loss (relational, social, occupational, financial) , Medical illness comorbidity , Sense of isolation , Severe  anxiety, and Suicidal ideation , Protective Factors include: Future-oriented talk , Willingness to seek help and support , Skills in problem solving, conflict resolution, and nonviolent handling of disputes, Access to a variety of clinical interventions , Receiving and engaged in care for mental, physical, and substance use disorders , History of adhering to treatment recommendations and/or prescribed medication regimen , Support through ongoing medical and mental healthcare relationships , and Restricted access to firearms or other lethal means of suicide   Imminent Risk of Violence or Homicide: Low Risk - Risk factors include: No significant risk factors identified on screening. Protective factors include: Lack of known history of harm to others , Lack of known history of violent ideation , Lack of known access to firearms , and Interpersonal competence   Treatment Plan:  There are no recently modified care plans to display for this patient.      Attestation Statements   Number of Minutes Spent Performing Evaluation & Management (E&M): 30

## 2024-06-27 NOTE — PROGRESS NOTES
Physical Therapy    Discharge Summary    Name: Dolores Salgado  MRN: [id number]  : 1970  Date: 24    Discharge Summary: PT    Discharge Information: Date of discharge 2024    Therapy Summary: Patient has been on hold 1 month without need for further follow up visits.    Rehab Discharge Reason: Achieved all and/or the most significant goals(s)

## 2024-07-10 ENCOUNTER — APPOINTMENT (OUTPATIENT)
Dept: PRIMARY CARE | Facility: CLINIC | Age: 54
End: 2024-07-10
Payer: MEDICARE

## 2024-08-12 DIAGNOSIS — F33.3 SEVERE EPISODE OF RECURRENT MAJOR DEPRESSIVE DISORDER, WITH PSYCHOTIC FEATURES (MULTI): ICD-10-CM

## 2024-08-12 RX ORDER — LEVOMEFOLATE/ALGAL OIL 15-90.314
15 CAPSULE ORAL DAILY
Qty: 30 CAPSULE | Refills: 11 | Status: SHIPPED | OUTPATIENT
Start: 2024-08-12 | End: 2025-08-12

## 2024-08-12 RX ORDER — LEVOMEFOLATE/ALGAL OIL 15-90.314
15 CAPSULE ORAL DAILY
Qty: 90 CAPSULE | Refills: 3 | Status: SHIPPED | OUTPATIENT
Start: 2024-08-12 | End: 2024-08-12 | Stop reason: SDUPTHER

## 2024-08-22 ENCOUNTER — APPOINTMENT (OUTPATIENT)
Dept: BEHAVIORAL HEALTH | Facility: CLINIC | Age: 54
End: 2024-08-22
Payer: MEDICARE

## 2024-09-04 DIAGNOSIS — F41.1 GENERALIZED ANXIETY DISORDER: ICD-10-CM

## 2024-09-04 RX ORDER — CLONAZEPAM 1 MG/1
1 TABLET ORAL 2 TIMES DAILY PRN
Qty: 20 TABLET | Refills: 0 | Status: SHIPPED | OUTPATIENT
Start: 2024-09-04 | End: 2024-09-18

## 2024-10-03 ENCOUNTER — OFFICE VISIT (OUTPATIENT)
Dept: URGENT CARE | Age: 54
End: 2024-10-03
Payer: MEDICARE

## 2024-10-03 VITALS
SYSTOLIC BLOOD PRESSURE: 98 MMHG | HEART RATE: 86 BPM | OXYGEN SATURATION: 99 % | DIASTOLIC BLOOD PRESSURE: 54 MMHG | RESPIRATION RATE: 16 BRPM | TEMPERATURE: 98.1 F

## 2024-10-03 DIAGNOSIS — J01.10 ACUTE NON-RECURRENT FRONTAL SINUSITIS: Primary | ICD-10-CM

## 2024-10-03 RX ORDER — AMOXICILLIN AND CLAVULANATE POTASSIUM 875; 125 MG/1; MG/1
1 TABLET, FILM COATED ORAL 2 TIMES DAILY
Qty: 20 TABLET | Refills: 0 | Status: SHIPPED | OUTPATIENT
Start: 2024-10-03 | End: 2024-10-13

## 2024-10-03 ASSESSMENT — ENCOUNTER SYMPTOMS
FEVER: 1
COUGH: 1
WHEEZING: 0
SORE THROAT: 1
SINUS PAIN: 1
SINUS PRESSURE: 1
SHORTNESS OF BREATH: 0
CHILLS: 0
CHEST TIGHTNESS: 0
RHINORRHEA: 1

## 2024-10-03 NOTE — PROGRESS NOTES
Subjective   Patient ID: Dolores Salgado is a 54 y.o. female. They present today with a chief complaint of Nasal Congestion (Congestion for several weeks. Also wants insect bite looked at).    History of Present Illness  Patient presents with illness symptoms x 16 days including: sinus pain, pressure, nasal discharge, sore throat, post nasal drainage, decreased sense of smell, cough and intermittent fevers. She states she had a negative home COVID test a few days ago. TX ATTEMPTED: decongestants, zinc and nasal sprays. DENIES:  chills, ear pain, SOB, wheezing, chest tightness.           Past Medical History  Allergies as of 10/03/2024    (No Known Allergies)       (Not in a hospital admission)       Past Medical History:   Diagnosis Date    Change in bowel habits 11/05/2023    Diarrhea 10/09/2023    Epigastric pain 04/12/2023    Incontinence of feces 11/05/2023    Rectal bleeding 08/22/2023       Past Surgical History:   Procedure Laterality Date    TONSILLECTOMY  04/12/2018    Tonsillectomy        reports that she has never smoked. She has never been exposed to tobacco smoke. She has never used smokeless tobacco. She reports current alcohol use of about 3.0 standard drinks of alcohol per week. She reports current drug use. Drug: Marijuana.    Review of Systems  Review of Systems   Constitutional:  Positive for fever. Negative for chills.   HENT:  Positive for congestion, rhinorrhea, sinus pressure, sinus pain and sore throat. Negative for ear discharge and ear pain.    Respiratory:  Positive for cough. Negative for chest tightness, shortness of breath and wheezing.                                   Objective    Vitals:    10/03/24 1147   BP: 98/54   Pulse: 86   Resp: 16   Temp: 36.7 °C (98.1 °F)   SpO2: 99%     No LMP recorded. (Menstrual status: Menopausal).    Physical Exam  Constitutional:       General: She is not in acute distress.     Appearance: Normal appearance. She is ill-appearing.   HENT:      Right Ear:  Tympanic membrane, ear canal and external ear normal.      Left Ear: Tympanic membrane, ear canal and external ear normal.      Nose: Congestion and rhinorrhea present.      Right Sinus: Frontal sinus tenderness present.      Left Sinus: Frontal sinus tenderness present.      Mouth/Throat:      Pharynx: No pharyngeal swelling, oropharyngeal exudate or posterior oropharyngeal erythema.      Tonsils: No tonsillar exudate or tonsillar abscesses.   Eyes:      General:         Right eye: No discharge.         Left eye: No discharge.      Conjunctiva/sclera: Conjunctivae normal.   Cardiovascular:      Rate and Rhythm: Normal rate and regular rhythm.      Pulses: Normal pulses.      Heart sounds: Normal heart sounds.   Pulmonary:      Effort: Pulmonary effort is normal. No respiratory distress.      Breath sounds: Normal breath sounds. No wheezing, rhonchi or rales.   Lymphadenopathy:      Cervical: Cervical adenopathy present.   Neurological:      Mental Status: She is alert and oriented to person, place, and time.         Procedures    Point of Care Test & Imaging Results from this visit  No results found for this visit on 10/03/24.   No results found.    Diagnostic study results (if any) were reviewed by Cecy Chatman PA-C.    Assessment/Plan   Allergies, medications, history, and pertinent labs/EKGs/Imaging reviewed by Cecy Chatman PA-C.     Medical Decision Making  Patient presents with signs and symptoms consistent with sinusitis. Given length of symptoms and lack of improvement with OTC symptomatic treatment, it seemed reasonable to treat with course of antibiotics. Discussed may continue with OTC symptomatic relief. Discussed to follow up with PCP if symptoms are not improving over the next 3-5 days, earlier with any acute worsening.      Orders and Diagnoses  Diagnoses and all orders for this visit:  Acute non-recurrent frontal sinusitis  -     amoxicillin-pot clavulanate (Augmentin) 875-125 mg tablet; Take 1  tablet by mouth 2 times a day for 10 days.      Medical Admin Record      Patient disposition: Home    Electronically signed by Cecy Chatman PA-C  12:10 PM

## 2024-10-09 ENCOUNTER — APPOINTMENT (OUTPATIENT)
Dept: INTEGRATIVE MEDICINE | Facility: CLINIC | Age: 54
End: 2024-10-09
Payer: MEDICARE

## 2024-10-10 ENCOUNTER — HOSPITAL ENCOUNTER (OUTPATIENT)
Dept: RADIOLOGY | Facility: CLINIC | Age: 54
Discharge: HOME | End: 2024-10-10
Payer: MEDICARE

## 2024-10-10 VITALS — WEIGHT: 106 LBS | BODY MASS INDEX: 17.66 KG/M2 | HEIGHT: 65 IN

## 2024-10-10 DIAGNOSIS — Z12.31 SCREENING MAMMOGRAM FOR BREAST CANCER: ICD-10-CM

## 2024-10-10 PROCEDURE — 77067 SCR MAMMO BI INCL CAD: CPT

## 2024-10-24 DIAGNOSIS — F41.1 GENERALIZED ANXIETY DISORDER: ICD-10-CM

## 2024-10-24 RX ORDER — LORAZEPAM 1 MG/1
1 TABLET ORAL 2 TIMES DAILY PRN
Qty: 60 TABLET | Refills: 0 | Status: SHIPPED | OUTPATIENT
Start: 2024-10-24 | End: 2024-11-23

## 2024-11-07 ENCOUNTER — OFFICE VISIT (OUTPATIENT)
Dept: PRIMARY CARE | Facility: CLINIC | Age: 54
End: 2024-11-07
Payer: MEDICARE

## 2024-11-07 VITALS
OXYGEN SATURATION: 98 % | BODY MASS INDEX: 17.83 KG/M2 | SYSTOLIC BLOOD PRESSURE: 119 MMHG | TEMPERATURE: 97.3 F | RESPIRATION RATE: 16 BRPM | WEIGHT: 107 LBS | HEART RATE: 97 BPM | HEIGHT: 65 IN | DIASTOLIC BLOOD PRESSURE: 76 MMHG

## 2024-11-07 DIAGNOSIS — G47.09 OTHER INSOMNIA: Primary | ICD-10-CM

## 2024-11-07 DIAGNOSIS — G89.29 CHRONIC BILATERAL LOW BACK PAIN WITHOUT SCIATICA: ICD-10-CM

## 2024-11-07 DIAGNOSIS — M54.50 CHRONIC BILATERAL LOW BACK PAIN WITHOUT SCIATICA: ICD-10-CM

## 2024-11-07 DIAGNOSIS — F41.8 DEPRESSION WITH ANXIETY: ICD-10-CM

## 2024-11-07 DIAGNOSIS — M43.6 STIFFNESS OF NECK: ICD-10-CM

## 2024-11-07 PROCEDURE — 3008F BODY MASS INDEX DOCD: CPT | Performed by: STUDENT IN AN ORGANIZED HEALTH CARE EDUCATION/TRAINING PROGRAM

## 2024-11-07 PROCEDURE — 1036F TOBACCO NON-USER: CPT | Performed by: STUDENT IN AN ORGANIZED HEALTH CARE EDUCATION/TRAINING PROGRAM

## 2024-11-07 PROCEDURE — 99214 OFFICE O/P EST MOD 30 MIN: CPT | Performed by: STUDENT IN AN ORGANIZED HEALTH CARE EDUCATION/TRAINING PROGRAM

## 2024-11-07 RX ORDER — MIRTAZAPINE 7.5 MG/1
7.5 TABLET, FILM COATED ORAL NIGHTLY
Qty: 30 TABLET | Refills: 1 | Status: SHIPPED | OUTPATIENT
Start: 2024-11-07 | End: 2025-01-06

## 2024-11-07 RX ORDER — TIZANIDINE 2 MG/1
2 TABLET ORAL 2 TIMES DAILY PRN
Qty: 30 TABLET | Refills: 1 | Status: SHIPPED | OUTPATIENT
Start: 2024-11-07 | End: 2025-01-06

## 2024-11-07 SDOH — ECONOMIC STABILITY: FOOD INSECURITY: WITHIN THE PAST 12 MONTHS, YOU WORRIED THAT YOUR FOOD WOULD RUN OUT BEFORE YOU GOT MONEY TO BUY MORE.: NEVER TRUE

## 2024-11-07 SDOH — ECONOMIC STABILITY: FOOD INSECURITY: WITHIN THE PAST 12 MONTHS, THE FOOD YOU BOUGHT JUST DIDN'T LAST AND YOU DIDN'T HAVE MONEY TO GET MORE.: NEVER TRUE

## 2024-11-07 ASSESSMENT — ENCOUNTER SYMPTOMS
UNEXPECTED WEIGHT CHANGE: 0
OCCASIONAL FEELINGS OF UNSTEADINESS: 1
DIZZINESS: 0
DEPRESSION: 1
SHORTNESS OF BREATH: 0
CONSTIPATION: 0
WHEEZING: 0
NAUSEA: 0
HEADACHES: 0
FEVER: 0
ARTHRALGIAS: 1
VOMITING: 0
PALPITATIONS: 0
COUGH: 0
ABDOMINAL PAIN: 0
CHILLS: 0
FATIGUE: 0
LOSS OF SENSATION IN FEET: 0
CONFUSION: 0
COLOR CHANGE: 0
DIARRHEA: 0

## 2024-11-07 ASSESSMENT — LIFESTYLE VARIABLES
HOW OFTEN DO YOU HAVE SIX OR MORE DRINKS ON ONE OCCASION: NEVER
HOW OFTEN DO YOU HAVE A DRINK CONTAINING ALCOHOL: MONTHLY OR LESS
AUDIT-C TOTAL SCORE: 1
SKIP TO QUESTIONS 9-10: 1
HOW MANY STANDARD DRINKS CONTAINING ALCOHOL DO YOU HAVE ON A TYPICAL DAY: 1 OR 2

## 2024-11-07 ASSESSMENT — PATIENT HEALTH QUESTIONNAIRE - PHQ9
SUM OF ALL RESPONSES TO PHQ9 QUESTIONS 1 & 2: 2
1. LITTLE INTEREST OR PLEASURE IN DOING THINGS: SEVERAL DAYS
2. FEELING DOWN, DEPRESSED OR HOPELESS: SEVERAL DAYS

## 2024-11-07 NOTE — PATIENT INSTRUCTIONS

## 2024-11-07 NOTE — PROGRESS NOTES
"Subjective   Patient ID: Dolores Salgado is a 54 y.o. female who presents for Follow-up (Pt is currently taking no medications.  Pt hasn't been sleeping well for the past 8 months) and Back Pain (Patient c/o lower back pain, which also started about the same time as the sleep issues).    HPI   She is here for FU visit. Reports she is not taking any meds currently, not even psych meds after discussing with psychiatrist as they were not helping much. Her anxiety/depression are about the same, not worsening; denies SI/HI and panic attacks. She is still following with psych monthly or so, however won't be able to see until 12/24 d/t doc being on paternity's leave. Reports having issues with sleep, also not able to gain wt for long time.   Also reports she cont to have lower back pain, prev saw PT with help, interested to see them again. Also she was put on Zanaflex with lot of help, interested on meds again.     Review of Systems   Constitutional:  Negative for chills, fatigue, fever and unexpected weight change.   HENT: Negative.     Respiratory:  Negative for cough, shortness of breath and wheezing.    Cardiovascular:  Negative for chest pain, palpitations and leg swelling.   Gastrointestinal:  Negative for abdominal pain, constipation, diarrhea, nausea and vomiting.   Musculoskeletal:  Positive for arthralgias.   Skin:  Negative for color change and rash.   Neurological:  Negative for dizziness and headaches.   Psychiatric/Behavioral:  Negative for behavioral problems and confusion.        Objective   /76 (BP Location: Right arm, Patient Position: Sitting, BP Cuff Size: Adult)   Pulse 97   Temp 36.3 °C (97.3 °F) (Temporal)   Resp 16   Ht 1.651 m (5' 5\")   Wt 48.5 kg (107 lb)   SpO2 98%   BMI 17.81 kg/m²     Physical Exam  Vitals and nursing note reviewed.   Constitutional:       Appearance: Normal appearance.   Cardiovascular:      Rate and Rhythm: Normal rate and regular rhythm.      Pulses: Normal pulses. "      Heart sounds: Normal heart sounds.   Pulmonary:      Effort: Pulmonary effort is normal.      Breath sounds: Normal breath sounds.   Abdominal:      General: Abdomen is flat. Bowel sounds are normal.      Palpations: Abdomen is soft.   Musculoskeletal:         General: Normal range of motion.      Comments: Mild tt[p on the R lower back, no swelling/rash noted. SLR neg bl in LE. Neurovasc intact.    Neurological:      General: No focal deficit present.      Mental Status: She is alert.      Gait: Gait normal.   Psychiatric:         Mood and Affect: Mood normal.         Behavior: Behavior normal.         Assessment/Plan   She is here for FU and with few concerns. Her insomnia likely 2/2 ongoing anxiety/depression, will do trial of remeron which may help with appetite too as pt trying to gain some wt. Adv pt to have healthy meals & snacks.   Also cont to have intermittent lower back pain, likely OA vs other MSK, start Zanaflex as needed; add physical therapy. Rec icing/heating packs few x daily. May consider referral to ortho at [facility] ( declined referral today).   Problem List Items Addressed This Visit             ICD-10-CM    Insomnia - Primary G47.00    Relevant Medications    mirtazapine (Remeron) 7.5 mg tablet    Depression with anxiety F41.8    Relevant Medications    mirtazapine (Remeron) 7.5 mg tablet    Stiffness of neck M43.6    Relevant Medications    tiZANidine (Zanaflex) 2 mg tablet     Other Visit Diagnoses         Codes    Chronic bilateral low back pain without sciatica     M54.50, G89.29    Relevant Medications    tiZANidine (Zanaflex) 2 mg tablet    Other Relevant Orders    Referral to Physical Therapy           Patient was identified as a fall risk. Risk prevention instructions provided.    Rtc 4-5 mo for MCR/FU   Nayan Jones MD   [facility], Family Medicine

## 2024-11-15 ENCOUNTER — APPOINTMENT (OUTPATIENT)
Dept: PHYSICAL THERAPY | Facility: HOSPITAL | Age: 54
End: 2024-11-15
Payer: MEDICARE

## 2024-11-20 ENCOUNTER — APPOINTMENT (OUTPATIENT)
Dept: PRIMARY CARE | Facility: CLINIC | Age: 54
End: 2024-11-20
Payer: MEDICARE

## 2024-11-22 ENCOUNTER — EVALUATION (OUTPATIENT)
Dept: PHYSICAL THERAPY | Facility: HOSPITAL | Age: 54
End: 2024-11-22
Payer: MEDICARE

## 2024-11-22 DIAGNOSIS — M54.50 CHRONIC BILATERAL LOW BACK PAIN WITHOUT SCIATICA: Primary | ICD-10-CM

## 2024-11-22 DIAGNOSIS — G89.29 CHRONIC BILATERAL LOW BACK PAIN WITHOUT SCIATICA: Primary | ICD-10-CM

## 2024-11-22 PROCEDURE — 97161 PT EVAL LOW COMPLEX 20 MIN: CPT | Mod: GP | Performed by: PHYSICAL THERAPIST

## 2024-11-22 PROCEDURE — 97110 THERAPEUTIC EXERCISES: CPT | Mod: GP | Performed by: PHYSICAL THERAPIST

## 2024-11-22 ASSESSMENT — PAIN - FUNCTIONAL ASSESSMENT: PAIN_FUNCTIONAL_ASSESSMENT: 0-10

## 2024-11-22 ASSESSMENT — ENCOUNTER SYMPTOMS
OCCASIONAL FEELINGS OF UNSTEADINESS: 0
LOSS OF SENSATION IN FEET: 0
DEPRESSION: 0

## 2024-11-22 ASSESSMENT — COLUMBIA-SUICIDE SEVERITY RATING SCALE - C-SSRS
6. HAVE YOU EVER DONE ANYTHING, STARTED TO DO ANYTHING, OR PREPARED TO DO ANYTHING TO END YOUR LIFE?: NO
2. HAVE YOU ACTUALLY HAD ANY THOUGHTS OF KILLING YOURSELF?: NO
1. IN THE PAST MONTH, HAVE YOU WISHED YOU WERE DEAD OR WISHED YOU COULD GO TO SLEEP AND NOT WAKE UP?: NO

## 2024-11-22 ASSESSMENT — PAIN SCALES - GENERAL: PAINLEVEL_OUTOF10: 5 - MODERATE PAIN

## 2024-11-22 ASSESSMENT — PATIENT HEALTH QUESTIONNAIRE - PHQ9
10. IF YOU CHECKED OFF ANY PROBLEMS, HOW DIFFICULT HAVE THESE PROBLEMS MADE IT FOR YOU TO DO YOUR WORK, TAKE CARE OF THINGS AT HOME, OR GET ALONG WITH OTHER PEOPLE: NOT DIFFICULT AT ALL
SUM OF ALL RESPONSES TO PHQ9 QUESTIONS 1 AND 2: 2
1. LITTLE INTEREST OR PLEASURE IN DOING THINGS: SEVERAL DAYS
2. FEELING DOWN, DEPRESSED OR HOPELESS: SEVERAL DAYS

## 2024-11-22 ASSESSMENT — PAIN DESCRIPTION - DESCRIPTORS: DESCRIPTORS: ACHING;SORE

## 2024-11-22 NOTE — PROGRESS NOTES
Physical Therapy    Physical Therapy Lumbar Spine Evaluation    Patient Name: Dolores Salgado  MRN: [id number]  Today's Date: 2024  Time Calculation  Start Time: 1345  Stop Time: 1435  Time Calculation (min): 50 min  PT Evaluation Time Entry  PT Evaluation (Low) Time Entry: 40  PT Therapeutic Procedures Time Entry  Therapeutic Exercise Time Entry: 10             Visit Number: 1  Auth Dates: No Auth    Current Problem  Problem List Items Addressed This Visit             ICD-10-CM    Chronic bilateral low back pain without sciatica - Primary M54.50, G89.29    Relevant Orders    Follow Up In Physical Therapy          General  Name and  confirmed with patient on this date.  Reason for Referral: Back pain  Referred By: Dr. Jones    Ambulatory Screening Summary     Screening  Frequency  Date Last Completed   Spiritual and Cultural Beliefs   Screening each visit or episode of care 2024   Falls Risk Screening every ambulatory visit 2024  9:36 PM   Pain Screening annually at primary care visit  2021   Domestic Violence screening annually at primary care visit 2024   Depression Screening annually in the primary care setting 2024   Suicide Risk Screening annually in the primary care setting 2024   Nutrition and Food Insecurity   Screening at least annually at primary care visit  2024   Key Learner annually in the primary care setting 2024   Drug Screen  2024 10:17 AM   Alcohol Screen  2024 10:17 AM   Advance Directive  3/4/2024       Precautions  Precautions  STEADI Fall Risk Score (The score of 4 or more indicates an increased risk of falling): 0  Precautions Comment: None       Pain  Pain Assessment: 0-10  0-10 (Numeric) Pain Score: 5 - Moderate pain  Pain Type: Chronic pain  Pain Location: Back  Pain Orientation: Lower  Pain Radiating Towards: Tailbone to pelvis  Pain Descriptors: Aching, Sore    SUBJECTIVE:   Chief complaint:  Patient reports chronic low back  "pain that starts down at her tailbone and radiates up her spine and into her pelvis. She has not been successful in finding relief and does not know exactly what caused it. She was recently prescribed a muscle relaxer that she feels did not change anything.    Pain Better: cold  Pain Worse: heat  Prior level of function: Yoga instructing, manages [employer]  Current limitations: pain when attempting home and work tasks, even sustained positions become painful  Work: , Disability for mental health, [employer]  Patients goal: improve back pain    OBJECTIVE:    Posture: mild forward flexed spine, rounded shoulders    Spine ROM: WNL Unless documented below:  ROM (In degrees)   Flexion    Extension     Right Left   Side Bend     Rotation         Lower Extremity Strength: (5/5 unless noted)   RIGHT LEFT   Hip Flexion     Hip Abduction 4+ 3+   Hip Ext 4+ 3+   Hip ER (prone)  4   Hip IR (prone)  3+   Knee Extension     Knee Flexion 4 4   Ankle DF     Ankle EV     Gr. Toe Extension       Palpation painful just inferior to PSIS bilaterally to below the PIIS, piriformis bilat, QL, and paraspinals in lumbar region  Neurological symptoms weakness consistent with left high lumbar nerve root distribution  Special tests:  SLR Left painful hamstring at 85 degrees, right not painful at 95 degrees  LLD: Right short ~1/4 in  SI compression (-)  SI distraction = improved pain  Hip Sheer = no change    Other Measures  Oswestry Disablity Index (ANGELICA): 36     TREATMENT:  EXERCISES      Date 11/22/2024           VISIT# #1 # #    REPS REPS REPS         Muscle Energy:      Right Ext/Left Flex 5' x 10     Hip Abd/ADD 5\" x 3 ea           US % (PRN) SI Joints            Hooklying:      Trans Abs      + LE March      + UE/LE Alt      + Crossover Oblique      + 100's core w/ breathing                        Quadruped:                                                      Stretches      Piriformis      Hamstring      Hip Flexor               "    HEP See below        Access Code: [email or web address]  URL: [email or web address]  Date: 11/22/2024  Prepared by: Emanuel Maurice    Exercises  - Supine SI Joint Self-Correction  - 4 x daily - 10 reps - 5 sec hold  - Hooklying Isometric Hip Abduction Adduction with Belt and Ball  - 4 x daily - 2 reps - 5 sec hold    ASSESSEMENT  Pt is a 54 y.o.  referred for physical therapy by Nayan Jones MD  for back pain. Pt presents with signs and symptoms consistent with SI joint dysfunction including left LE weakness, LLD, pain in the low back and SI joints, and difficulty with sustained positions and transitions. This patient would benefit from a therapy program to restore prior level of function, decrease pain, increase AROM, increase strength and improve posture.    The physical therapy prognosis is good for the patient to achieve their goals.   The pt tolerated therapy treatment today with no adverse effects.  Barriers to therapy/learning include:  none    PLAN  The pt will be seen 1 days a week for 6 weeks.      The pt has been educated about the risks and benefits of physical therapy and gives consent for treatment.     The patient will benefit from physical therapy treatment to include: Treatment/Interventions: Education/ Instruction, Therapeutic exercises, Therapeutic activities, Neuromuscular re-education, Manual therapy, Self care/ home management, Gait training, Ultrasound, Dry needling    Goals:  Active       PT Problem       PT Goal 1       Start:  11/22/24    Expected End:  01/03/25       Independent home exercise program with 90% teach back capability by the patient           PT Goal 2       Start:  11/22/24    Expected End:  01/03/25       ROM of spine and LE flexibility improves to symmetry and WFLs to ease 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

## 2024-12-06 ENCOUNTER — TREATMENT (OUTPATIENT)
Dept: PHYSICAL THERAPY | Facility: HOSPITAL | Age: 54
End: 2024-12-06
Payer: MEDICARE

## 2024-12-06 ENCOUNTER — TELEPHONE (OUTPATIENT)
Dept: PRIMARY CARE | Facility: CLINIC | Age: 54
End: 2024-12-06

## 2024-12-06 DIAGNOSIS — G89.29 CHRONIC BILATERAL LOW BACK PAIN WITHOUT SCIATICA: Primary | ICD-10-CM

## 2024-12-06 DIAGNOSIS — M54.50 CHRONIC BILATERAL LOW BACK PAIN WITHOUT SCIATICA: Primary | ICD-10-CM

## 2024-12-06 DIAGNOSIS — M43.6 STIFFNESS OF NECK: ICD-10-CM

## 2024-12-06 PROCEDURE — 97140 MANUAL THERAPY 1/> REGIONS: CPT | Mod: GP | Performed by: PHYSICAL THERAPIST

## 2024-12-06 PROCEDURE — 97035 APP MDLTY 1+ULTRASOUND EA 15: CPT | Mod: GP | Performed by: PHYSICAL THERAPIST

## 2024-12-06 ASSESSMENT — PAIN SCALES - GENERAL: PAINLEVEL_OUTOF10: 7

## 2024-12-06 ASSESSMENT — PAIN - FUNCTIONAL ASSESSMENT: PAIN_FUNCTIONAL_ASSESSMENT: 0-10

## 2024-12-06 ASSESSMENT — PAIN DESCRIPTION - DESCRIPTORS: DESCRIPTORS: ACHING;SORE

## 2024-12-06 NOTE — PROGRESS NOTES
"Physical Therapy    Physical Therapy Treatment    Patient Name: Dolores Salgado  MRN: [id number]  : 1970   Today's Date: 2024  Time Calculation  Start Time: 905  Stop Time: 950  Time Calculation (min): 45 min  PT Therapeutic Procedures Time Entry  Manual Therapy Time Entry: 30  PT Modalities Time Entry  Ultrasound Time Entry: 10        Visit Number: 2  Auth: No auth    Current Problem  Problem List Items Addressed This Visit             ICD-10-CM    Chronic bilateral low back pain without sciatica - Primary M54.50, G89.29        Subjective   Patient thinks she made it worse by going to the gym and working too hard and doing some other activities involving lifting and carrying. She has also been doing more hiking recently.    Precautions  Precautions  STEADI Fall Risk Score (The score of 4 or more indicates an increased risk of falling): 0  Precautions Comment: None    Pain  Pain Assessment: 0-10  0-10 (Numeric) Pain Score: 7  Pain Location: Back  Pain Orientation: Lower  Pain Radiating Towards: Tailbone to pelvis  Pain Descriptors: Aching, Sore    Objective   Lower Extremity Strength: (5/5 unless noted)   RIGHT LEFT   Hip ER (prone)  4   Hip IR (prone)  3+     Palpation painful just inferior to PSIS bilaterally to below the PIIS, piriformis bilat, QL, and paraspinals in lumbar region  Neurological symptoms weakness consistent with left high lumbar nerve root distribution  Special tests:  SLR painful hamstring at 85 degrees  LLD: (-) today     Treatments:  EXERCISES      Date 2024          VISIT# #1 #2 #    REPS REPS REPS         Muscle Energy:      Right Ext/Left Flex 5' x 10 NT    Hip Abd/ADD 5\" x 3 ea NT          US % (PRN) SI Joints  10'          STM  QL, paraspinals, piriformis, gluts  30'          Hooklying:      Trans Abs      + LE March      + UE/LE Alt      + Crossover Oblique      + 100's core w/ breathing                        Quadruped:                                             "          Stretches      Piriformis  Next time?    Hamstring      Hip Flexor                  HEP See below Hold off muscle energy until next follow up. Tennis ball TPR to piriformis against wall (verbally)      Assessment:     Patient reports 2/10 pain after treatment today. She stated this was the best she has felt in a while.  Left ankle MMT was WFL today without clinical support for radicular weakness.     Plan:  Monitor long term response to treatment today as well as HEP compliance and avoiding activities that trigger symptoms.  OP PT Plan  Treatment/Interventions: Education/ Instruction, Therapeutic exercises, Therapeutic activities, Neuromuscular re-education, Manual therapy, Self care/ home management, Gait training, Ultrasound, Dry needling  PT Plan: Skilled PT  PT Frequency: 1 time per week  Duration: 6 weeks  Rehab Potential: Good  Plan of Care Agreement: Patient    Goals:  Active       PT Problem       PT Goal 1       Start:  11/22/24    Expected End:  01/03/25       Independent home exercise program with 90% teach back capability by the patient           PT Goal 2       Start:  11/22/24    Expected End:  01/03/25       ROM of spine and LE flexibility improves to symmetry and WFLs to ease 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

## 2024-12-12 ENCOUNTER — OFFICE VISIT (OUTPATIENT)
Dept: BEHAVIORAL HEALTH | Facility: CLINIC | Age: 54
End: 2024-12-12
Payer: MEDICARE

## 2024-12-12 DIAGNOSIS — F33.3 SEVERE EPISODE OF RECURRENT MAJOR DEPRESSIVE DISORDER, WITH PSYCHOTIC FEATURES (MULTI): ICD-10-CM

## 2024-12-12 DIAGNOSIS — F41.1 GENERALIZED ANXIETY DISORDER: ICD-10-CM

## 2024-12-12 PROCEDURE — 99214 OFFICE O/P EST MOD 30 MIN: CPT | Performed by: STUDENT IN AN ORGANIZED HEALTH CARE EDUCATION/TRAINING PROGRAM

## 2024-12-12 PROCEDURE — 99214 OFFICE O/P EST MOD 30 MIN: CPT | Mod: AM | Performed by: STUDENT IN AN ORGANIZED HEALTH CARE EDUCATION/TRAINING PROGRAM

## 2024-12-13 ENCOUNTER — TREATMENT (OUTPATIENT)
Dept: PHYSICAL THERAPY | Facility: HOSPITAL | Age: 54
End: 2024-12-13
Payer: MEDICARE

## 2024-12-13 DIAGNOSIS — G89.29 CHRONIC BILATERAL LOW BACK PAIN WITHOUT SCIATICA: Primary | ICD-10-CM

## 2024-12-13 DIAGNOSIS — M54.50 CHRONIC BILATERAL LOW BACK PAIN WITHOUT SCIATICA: Primary | ICD-10-CM

## 2024-12-13 PROCEDURE — 97140 MANUAL THERAPY 1/> REGIONS: CPT | Mod: GP | Performed by: PHYSICAL THERAPIST

## 2024-12-13 PROCEDURE — 97110 THERAPEUTIC EXERCISES: CPT | Mod: GP | Performed by: PHYSICAL THERAPIST

## 2024-12-13 ASSESSMENT — PAIN - FUNCTIONAL ASSESSMENT: PAIN_FUNCTIONAL_ASSESSMENT: 0-10

## 2024-12-13 ASSESSMENT — PAIN DESCRIPTION - DESCRIPTORS: DESCRIPTORS: DULL;HEAVINESS;PRESSURE

## 2024-12-13 ASSESSMENT — PAIN SCALES - GENERAL: PAINLEVEL_OUTOF10: 3

## 2024-12-13 NOTE — PROGRESS NOTES
"Physical Therapy    Physical Therapy Treatment    Patient Name: Dolores Salgado  MRN: [id number]  : 1970   Today's Date: 2024  Time Calculation  Start Time: 920  Stop Time: 1005  Time Calculation (min): 45 min  PT Therapeutic Procedures Time Entry  Manual Therapy Time Entry: 25  Therapeutic Exercise Time Entry: 10  PT Modalities Time Entry  Ultrasound Time Entry: 10        Visit Number: 3  Auth: no auth    Current Problem  Problem List Items Addressed This Visit             ICD-10-CM    Chronic bilateral low back pain without sciatica - Primary M54.50, G89.29        Subjective   Patient reports that the last treatment really helped, but she is still having pressure in the tailbone area when weight bearing through her pelvis.  She was able to play pickle ball this week and had the best mobility on the court she has had in a while. She is still planning a ski trip in January.     Precautions  Precautions  Precautions Comment: None    Pain  Pain Assessment: 0-10  0-10 (Numeric) Pain Score: 3  Pain Location: Back  Pain Orientation: Lower  Pain Radiating Towards: Tailbone to pelvis  Pain Descriptors: Dull, Heaviness, Pressure    Objective   DF and EV 5/5 bilat.  Soreness to palpation over lateral boarders of sacrum.       Treatments:  EXERCISES      Date 2024         VISIT# #1 #2 #3    REPS REPS REPS         Muscle Energy:      Right Ext/Left Flex 5' x 10 NT    Hip Abd/ADD 5\" x 3 ea NT          US % (PRN) SI Joints  10' 10' 50%         STM  QL, paraspinals, piriformis, gluts  30' 25'         Hooklying:      Trans Abs      + LE March      + UE/LE Alt      + Crossover Oblique      + 100's core w/ breathing            Sidelying ABD progression   Next time         Quadruped:                                                      Stretches      Piriformis (pigeon)  Next time? 3 x 30\" ea   Hamstring      Hip Flexor                  HEP See below Hold off muscle energy until next follow up. " Tennis ball TPR to piriformis against wall (verbally) See below     Access Code: [email or web address]  URL: [email or web address]  Date: 12/13/2024  Prepared by: Emanuel Maurice    Exercises  - Hip External Rotation Stretch  - 2 x daily - 3 reps - 30 sec hold  - Hip External Rotation Stretch (Mirrored)  - 2 x daily - 3 reps - 30 sec hold    Assessment:     Patient reports 0/10 pain after treatment today. Patient is making gains in pain management with normal ADLs and is improving in HEP compliance and application.     Plan:  OP PT Plan  Treatment/Interventions: Education/ Instruction, Therapeutic exercises, Therapeutic activities, Neuromuscular re-education, Manual therapy, Self care/ home management, Gait training, Ultrasound, Dry needling  PT Plan: Skilled PT  PT Frequency: 1 time per week  Duration: 6 weeks  Rehab Potential: Good  Plan of Care Agreement: Patient    Goals:  Active       PT Problem       PT Goal 1 (Progressing)       Start:  11/22/24    Expected End:  01/03/25       Independent home exercise program with 90% teach back capability by the patient           PT Goal 2 (Progressing)       Start:  11/22/24    Expected End:  01/03/25       ROM of spine and LE flexibility improves to symmetry and WFLs to ease 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

## 2024-12-20 ENCOUNTER — TREATMENT (OUTPATIENT)
Dept: PHYSICAL THERAPY | Facility: HOSPITAL | Age: 54
End: 2024-12-20
Payer: MEDICARE

## 2024-12-20 DIAGNOSIS — G89.29 CHRONIC BILATERAL LOW BACK PAIN WITHOUT SCIATICA: Primary | ICD-10-CM

## 2024-12-20 DIAGNOSIS — M54.50 CHRONIC BILATERAL LOW BACK PAIN WITHOUT SCIATICA: Primary | ICD-10-CM

## 2024-12-20 PROCEDURE — 97140 MANUAL THERAPY 1/> REGIONS: CPT | Mod: GP | Performed by: PHYSICAL THERAPIST

## 2024-12-20 PROCEDURE — 97035 APP MDLTY 1+ULTRASOUND EA 15: CPT | Mod: GP | Performed by: PHYSICAL THERAPIST

## 2024-12-20 ASSESSMENT — PAIN - FUNCTIONAL ASSESSMENT: PAIN_FUNCTIONAL_ASSESSMENT: 0-10

## 2024-12-20 ASSESSMENT — PAIN DESCRIPTION - DESCRIPTORS: DESCRIPTORS: DULL;HEAVINESS

## 2024-12-20 ASSESSMENT — PAIN SCALES - GENERAL: PAINLEVEL_OUTOF10: 5 - MODERATE PAIN

## 2024-12-20 NOTE — PROGRESS NOTES
"Physical Therapy    Physical Therapy Treatment    Patient Name: Dolores Salgado  MRN: [id number]  : 1970   Today's Date: 2024  Time Calculation  Start Time: 1045  Stop Time: 1123  Time Calculation (min): 38 min  PT Therapeutic Procedures Time Entry  Manual Therapy Time Entry: 30  PT Modalities Time Entry  Ultrasound Time Entry: 8        Visit Number: 4  Auth: no auth required    Current Problem  Problem List Items Addressed This Visit             ICD-10-CM    Chronic bilateral low back pain without sciatica - Primary M54.50, G89.29        Subjective   Patient played [place] ball on Monday with some discomfort/soreness. She also is still feeling \"nerve\" pain that feels like pressure or weight that can pulsate in her pelvis (tailbone area) that keeps her awake at night. She consulted a chiropractor who is working with her on the alignment and this has seemed to help too.     Precautions  Precautions  Precautions Comment: None    Pain  Pain Assessment: 0-10  0-10 (Numeric) Pain Score: 5 - Moderate pain  Pain Location: Back  Pain Orientation: Lower  Pain Radiating Towards: Tailbone to pelvis  Pain Descriptors: Dull, Heaviness    Objective   DF and EV 5/5 bilat.  Soreness to palpation over lateral boarders of sacrum.     Treatments:  EXERCISES       Date 2024          VISIT# #1 #2 #3 #4    REPS REPS REPS           Muscle Energy:       Right Ext/Left Flex 5' x 10 NT     Hip Abd/ADD 5\" x 3 ea NT            US % (PRN) SI Joints  10' 8' 50% 8' 100%          STM  QL, paraspinals, piriformis, gluts  30' QL, paraspinals, piriformis, gluts  30' QL, paraspinals, piriformis, gluts  30'          Hooklying:       Trans Abs       + LE March       + UE/LE Alt       + Crossover Oblique       + 100's core w/ breathing              Sidelying ABD and QL progression    Next time          Quadruped:                                                               Stretches       Piriformis (pigeon)  " Next time? Reviewed and HEP    Hamstring       Hip Flexor                     HEP See below Hold off muscle energy until next follow up. Tennis ball TPR to piriformis against wall (verbally) See below        Assessment:     Patient reports 1/10 pain after treatment today. Patient reported that she felt really good at the end of the session.    Plan:  OP PT Plan  Treatment/Interventions: Education/ Instruction, Therapeutic exercises, Therapeutic activities, Neuromuscular re-education, Manual therapy, Self care/ home management, Gait training, Ultrasound, Dry needling  PT Plan: Skilled PT  PT Frequency: 1 time per week  Duration: 6 weeks  Rehab Potential: Good  Plan of Care Agreement: Patient    Goals:  Active       PT Problem       PT Goal 1 (Progressing)       Start:  11/22/24    Expected End:  01/03/25       Independent home exercise program with 90% teach back capability by the patient           PT Goal 2 (Progressing)       Start:  11/22/24    Expected End:  01/03/25       ROM of spine and LE flexibility improves to symmetry and WFLs to ease 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

## 2024-12-27 ENCOUNTER — TREATMENT (OUTPATIENT)
Dept: PHYSICAL THERAPY | Facility: HOSPITAL | Age: 54
End: 2024-12-27
Payer: MEDICARE

## 2024-12-27 DIAGNOSIS — G89.29 CHRONIC BILATERAL LOW BACK PAIN WITHOUT SCIATICA: Primary | ICD-10-CM

## 2024-12-27 DIAGNOSIS — M54.50 CHRONIC BILATERAL LOW BACK PAIN WITHOUT SCIATICA: Primary | ICD-10-CM

## 2024-12-27 PROCEDURE — 97035 APP MDLTY 1+ULTRASOUND EA 15: CPT | Mod: GP | Performed by: PHYSICAL THERAPIST

## 2024-12-27 PROCEDURE — 97110 THERAPEUTIC EXERCISES: CPT | Mod: GP | Performed by: PHYSICAL THERAPIST

## 2024-12-27 PROCEDURE — 97140 MANUAL THERAPY 1/> REGIONS: CPT | Mod: GP | Performed by: PHYSICAL THERAPIST

## 2024-12-27 ASSESSMENT — PAIN DESCRIPTION - DESCRIPTORS: DESCRIPTORS: DULL;HEAVINESS

## 2024-12-27 ASSESSMENT — PAIN - FUNCTIONAL ASSESSMENT: PAIN_FUNCTIONAL_ASSESSMENT: 0-10

## 2024-12-27 ASSESSMENT — PAIN SCALES - GENERAL: PAINLEVEL_OUTOF10: 4

## 2024-12-27 NOTE — PROGRESS NOTES
Physical Therapy    Physical Therapy Treatment    Patient Name: Dolores Salgado  MRN: [id number]  : 1970   Today's Date: 2024  Time Calculation  Start Time: 1045  Stop Time: 1123  Time Calculation (min): 38 min  PT Therapeutic Procedures Time Entry  Manual Therapy Time Entry: 22  Therapeutic Exercise Time Entry: 8  PT Modalities Time Entry  Ultrasound Time Entry: 8        Visit Number: 5  Auth: No auth, BOMN    Current Problem  Problem List Items Addressed This Visit             ICD-10-CM    Chronic bilateral low back pain without sciatica - Primary M54.50, G89.29        Subjective   Patient reports that she feels things are going in the right direction. She is still having pain with positioning in bed. She did have one day before  with sciatic-like pain down both legs. She is still going to her Chiropractor for adjustments.     Precautions  Precautions  Precautions Comment: None    Pain  Pain Assessment: 0-10  0-10 (Numeric) Pain Score: 4  Pain Location: Back  Pain Orientation: Lower  Pain Radiating Towards: Tailbone to pelvis  Pain Descriptors: Dull, Heaviness    Objective   Sitting posture = rounded shoulders and slouched low back  Palpation = bilateral PIIS still painful to palpation, piriformis bilaterally still sore to palpation     Treatments:  EXERCISES      Date 2024         VISIT# #3 #4 #5    REPS           Muscle Energy:      Right Ext/Left Flex      Hip Abd/ADD            US % (PRN) SI Joints 8' 50% 8' 100% 8', 100%         STM QL, paraspinals, piriformis, gluts  30' QL, paraspinals, piriformis, gluts  30' QL, paraspinals, piriformis, gluts  22'         Hooklying:      Trans Abs      + LE March      + UE/LE Alt      + Crossover Oblique      + 100's core w/ breathing            Sidelying ABD and QL progression  Next time QL bilat 2x10         Quadruped:                                                      Stretches      Piriformis (pigeon) Reviewed and HEP      Hamstring      Hip Flexor                  HEP See note  See below     Access Code: [email or web address]  URL: [email or web address]  Date: 12/27/2024  Prepared by: Emanuel Maurice    Exercises  - QL Activation  - 1 x daily - 2 sets - 10 reps - 1 sec hold  - QL Activation (Mirrored)  - 1 x daily - 2 sets - 10 reps - 1 sec hold    Assessment:     Patient reports 2/10 pain after treatment today.    Plan:  OP PT Plan  Treatment/Interventions: Education/ Instruction, Therapeutic exercises, Therapeutic activities, Neuromuscular re-education, Manual therapy, Self care/ home management, Gait training, Ultrasound, Dry needling  PT Plan: Skilled PT  PT Frequency: 1 time per week  Duration: 6 weeks  Rehab Potential: Good  Plan of Care Agreement: Patient    Goals:  Active       PT Problem       PT Goal 1 (Progressing)       Start:  11/22/24    Expected End:  01/03/25       Independent home exercise program with 90% teach back capability by the patient           PT Goal 2 (Progressing)       Start:  11/22/24    Expected End:  01/03/25       ROM of spine and LE flexibility improves to symmetry and WFLs to ease 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

## 2025-01-01 ENCOUNTER — TELEPHONE (OUTPATIENT)
Dept: PRIMARY CARE | Facility: CLINIC | Age: 55
End: 2025-01-01
Payer: MEDICARE

## 2025-01-03 RX ORDER — DEXTROMETHORPHAN HYDROBROMIDE, BUPROPION HYDROCHLORIDE 105; 45 MG/1; MG/1
1 TABLET, MULTILAYER, EXTENDED RELEASE ORAL EVERY MORNING
COMMUNITY

## 2025-01-03 NOTE — PROGRESS NOTES
Subjective    All Individuals Present: Patient and Provider (Encounter Provider)     ID: Dolores Salgado is a 54 y.o. female with history of severe MDD and PTSD.     Interval History/HPI/PFSH:  Still struggling with some racing thoughts and feeling overwhelmed, paralyzed by anxiety. Trying to focus on psychotherapy as way past this and doing deep dives in therapy.    Remains with dense neurovegetative symptoms, no current SI.     Denies any current HI, AVH.      Medication side effects:  sedation , sexual dysfunction     Review of Systems  Constitutional: Positive for fatigue, Negative for fevers  Psychiatric: Positive for anxiety, decreased appetite, depression, fatigue, irritability, learning difficulty, loss of interest in favorite activities, mood swings, and sleep disturbance, Negative for aggressive behavior, excessive alcohol consumption, illegal drug usage, and tobacco use  Neurological: Positive for memory problems, Negative for gait problems, headaches, seizures, speech problems, and weakness   Other: vaginismus    Objective   There were no vitals taken for this visit.  Wt Readings from Last 4 Encounters:   11/07/24 48.5 kg (107 lb)   10/10/24 48.1 kg (106 lb)   04/23/24 52.2 kg (115 lb)   03/04/24 51.7 kg (114 lb)       Mental Status Exam  General Appearance: Fairly groomed.  Attitude/Behavior: Cooperative, conversant, engaged, and with good eye contact.  Motor: Psychomotor retardation.  Speech: Slow speech  Gait/Station: Within normal limits  Mood: depressed.  Affect: Dysphoric, constricted  and Congruent with mood and topic of conversation  Thought Process: Linear, goal directed, Perseverative  Thought Associations: No loosening of associations  Thought Content:  chronic SI  Sensorium: Alert and oriented to person, place, time and situation  Insight: Intact, as evidenced by awareness of symptom patterns  Judgment: Intact  Cognition: Attention: Mild impairment in attention, Fund of Knowledge: Good, Language:  Word finding difficulties, and Orientation: Ox4  Testing:  see prior neuropsychology/neuropsychiatry notes.    Laboratory/Imaging/Diagnostic Tests       Assessment/Plan   Overall Formulation and Differential Diagnosis:  Dolores Salgado is a 54 y.o. female who meets criteria for severe MDD and PTSD.  Interval Assessment:  History of MDD for years with significant worsening depressed mood, especially secondary to grief of partner committing suicide 2020. Has had numerous psychotropic trials, recently started on vilazodone 10 mg, recently on vortioxetine. Has also been placed on Li 600 mg as depression augmentation given SI, but decision to not increase further related to current poor PO intake related to depression/neurovegatitve status and diminished IADLs. Has had multiple rounds of ECT on two separate occasions with no discernible benefit. Current SI passive, no plan/intent, able to contract for safety.     *In the interim, mood no longer dipping. Having adverse sexual side effects from medications, working towards simplification. Trying to consolidate medications to help reduce side effect profile, while monitoring to make sure no exacerbation in mood. Able to contract for safety.     Dx:  -MDD, recurrent, severe, with psychotic features  -PTSD  -illness anxiety disorder  -complicated bereavement  -r/o cognitive dysfunction d/t vascular etiology     Plan:  -continue duloxetine 60 mg PO daily; consider dose alteration re: side effects  -continue Auvelity  mg PO daily  -Li discontinued d/t toxicity  -continue clonazepam to 1 mg 1-2x/day prn panic  -completed esketamine  -continue individual therapy  -RTC 4 week     Risk Assessment:  Imminent Risk of Suicide or Serious Self-Injury: Medium Risk - Risk factors include: {Depression, History of suicide attempt , History of trauma or abuse , Local clusters of suicide or exposure to others who have  by suicide , Loss (relational, social, occupational, financial) ,  Medical illness comorbidity , Sense of isolation , Severe anxiety, and Suicidal ideation , Protective Factors include: Future-oriented talk , Willingness to seek help and support , Skills in problem solving, conflict resolution, and nonviolent handling of disputes, Access to a variety of clinical interventions , Receiving and engaged in care for mental, physical, and substance use disorders , History of adhering to treatment recommendations and/or prescribed medication regimen , Support through ongoing medical and mental healthcare relationships , and Restricted access to firearms or other lethal means of suicide   Imminent Risk of Violence or Homicide: Low Risk - Risk factors include: No significant risk factors identified on screening. Protective factors include: Lack of known history of harm to others , Lack of known history of violent ideation , Lack of known access to firearms , and Interpersonal competence   Treatment Plan:  There are no recently modified care plans to display for this patient.      Attestation Statements   Number of Minutes Spent Performing Evaluation & Management (E&M): 30

## 2025-01-07 ENCOUNTER — TELEPHONE (OUTPATIENT)
Dept: PRIMARY CARE | Facility: CLINIC | Age: 55
End: 2025-01-07
Payer: MEDICARE

## 2025-01-07 DIAGNOSIS — G89.29 CHRONIC BILATERAL LOW BACK PAIN WITHOUT SCIATICA: Primary | ICD-10-CM

## 2025-01-07 DIAGNOSIS — M54.50 CHRONIC BILATERAL LOW BACK PAIN WITHOUT SCIATICA: Primary | ICD-10-CM

## 2025-01-10 ENCOUNTER — APPOINTMENT (OUTPATIENT)
Dept: PHYSICAL THERAPY | Facility: HOSPITAL | Age: 55
End: 2025-01-10
Payer: MEDICARE

## 2025-01-10 ENCOUNTER — HOSPITAL ENCOUNTER (OUTPATIENT)
Dept: RADIOLOGY | Facility: CLINIC | Age: 55
Discharge: HOME | End: 2025-01-10
Payer: MEDICARE

## 2025-01-10 DIAGNOSIS — G89.29 CHRONIC BILATERAL LOW BACK PAIN WITHOUT SCIATICA: ICD-10-CM

## 2025-01-10 DIAGNOSIS — M54.50 CHRONIC BILATERAL LOW BACK PAIN WITHOUT SCIATICA: ICD-10-CM

## 2025-01-10 PROCEDURE — 72100 X-RAY EXAM L-S SPINE 2/3 VWS: CPT

## 2025-01-13 ENCOUNTER — TELEPHONE (OUTPATIENT)
Dept: PRIMARY CARE | Facility: CLINIC | Age: 55
End: 2025-01-13
Payer: MEDICARE

## 2025-01-13 NOTE — TELEPHONE ENCOUNTER
----- Message from Nayan Jones sent at 1/12/2025  8:05 PM EST -----  Xray-lumbar: mild scoliosis and mild arthritis on lumbar spine. Cont following with PT. See us in office for further Qs/problems.  Follow up as scheduled. -Dr. Jones

## 2025-01-17 ENCOUNTER — APPOINTMENT (OUTPATIENT)
Dept: PHYSICAL THERAPY | Facility: HOSPITAL | Age: 55
End: 2025-01-17
Payer: MEDICARE

## 2025-01-18 ENCOUNTER — APPOINTMENT (OUTPATIENT)
Dept: RADIOLOGY | Facility: HOSPITAL | Age: 55
End: 2025-01-18
Payer: MEDICARE

## 2025-01-18 ENCOUNTER — HOSPITAL ENCOUNTER (EMERGENCY)
Facility: HOSPITAL | Age: 55
Discharge: HOME | End: 2025-01-19
Payer: MEDICARE

## 2025-01-18 VITALS
BODY MASS INDEX: 18.83 KG/M2 | SYSTOLIC BLOOD PRESSURE: 106 MMHG | HEART RATE: 70 BPM | DIASTOLIC BLOOD PRESSURE: 70 MMHG | OXYGEN SATURATION: 100 % | TEMPERATURE: 97.9 F | RESPIRATION RATE: 18 BRPM | WEIGHT: 113 LBS | HEIGHT: 65 IN

## 2025-01-18 DIAGNOSIS — N39.0 URINARY TRACT INFECTION WITHOUT HEMATURIA, SITE UNSPECIFIED: Primary | ICD-10-CM

## 2025-01-18 DIAGNOSIS — M54.50 ACUTE BILATERAL LOW BACK PAIN WITHOUT SCIATICA: ICD-10-CM

## 2025-01-18 DIAGNOSIS — D64.9 ANEMIA, UNSPECIFIED TYPE: ICD-10-CM

## 2025-01-18 LAB
ALBUMIN SERPL BCP-MCNC: 4 G/DL (ref 3.4–5)
ALP SERPL-CCNC: 52 U/L (ref 33–110)
ALT SERPL W P-5'-P-CCNC: 10 U/L (ref 7–45)
ANION GAP SERPL CALC-SCNC: 8 MMOL/L (ref 10–20)
APPEARANCE UR: CLEAR
AST SERPL W P-5'-P-CCNC: 14 U/L (ref 9–39)
BACTERIA #/AREA URNS AUTO: ABNORMAL /HPF
BASOPHILS # BLD AUTO: 0.06 X10*3/UL (ref 0–0.1)
BASOPHILS NFR BLD AUTO: 1 %
BILIRUB SERPL-MCNC: 0.4 MG/DL (ref 0–1.2)
BILIRUB UR STRIP.AUTO-MCNC: NEGATIVE MG/DL
BUN SERPL-MCNC: 13 MG/DL (ref 6–23)
CALCIUM SERPL-MCNC: 9.6 MG/DL (ref 8.6–10.3)
CHLORIDE SERPL-SCNC: 107 MMOL/L (ref 98–107)
CO2 SERPL-SCNC: 28 MMOL/L (ref 21–32)
COLOR UR: ABNORMAL
CREAT SERPL-MCNC: 0.75 MG/DL (ref 0.5–1.05)
EGFRCR SERPLBLD CKD-EPI 2021: >90 ML/MIN/1.73M*2
EOSINOPHIL # BLD AUTO: 0.2 X10*3/UL (ref 0–0.7)
EOSINOPHIL NFR BLD AUTO: 3.4 %
ERYTHROCYTE [DISTWIDTH] IN BLOOD BY AUTOMATED COUNT: 12.8 % (ref 11.5–14.5)
GLUCOSE SERPL-MCNC: 103 MG/DL (ref 74–99)
GLUCOSE UR STRIP.AUTO-MCNC: NORMAL MG/DL
HCT VFR BLD AUTO: 35.3 % (ref 36–46)
HGB BLD-MCNC: 11.9 G/DL (ref 12–16)
IMM GRANULOCYTES # BLD AUTO: 0.01 X10*3/UL (ref 0–0.7)
IMM GRANULOCYTES NFR BLD AUTO: 0.2 % (ref 0–0.9)
KETONES UR STRIP.AUTO-MCNC: NEGATIVE MG/DL
LACTATE SERPL-SCNC: 0.5 MMOL/L (ref 0.4–2)
LEUKOCYTE ESTERASE UR QL STRIP.AUTO: ABNORMAL
LYMPHOCYTES # BLD AUTO: 1.94 X10*3/UL (ref 1.2–4.8)
LYMPHOCYTES NFR BLD AUTO: 33.1 %
MCH RBC QN AUTO: 30 PG (ref 26–34)
MCHC RBC AUTO-ENTMCNC: 33.7 G/DL (ref 32–36)
MCV RBC AUTO: 89 FL (ref 80–100)
MONOCYTES # BLD AUTO: 0.57 X10*3/UL (ref 0.1–1)
MONOCYTES NFR BLD AUTO: 9.7 %
NEUTROPHILS # BLD AUTO: 3.08 X10*3/UL (ref 1.2–7.7)
NEUTROPHILS NFR BLD AUTO: 52.6 %
NITRITE UR QL STRIP.AUTO: NEGATIVE
NRBC BLD-RTO: 0 /100 WBCS (ref 0–0)
PH UR STRIP.AUTO: 6.5 [PH]
PLATELET # BLD AUTO: 297 X10*3/UL (ref 150–450)
POTASSIUM SERPL-SCNC: 4 MMOL/L (ref 3.5–5.3)
PROT SERPL-MCNC: 6.5 G/DL (ref 6.4–8.2)
PROT UR STRIP.AUTO-MCNC: NEGATIVE MG/DL
RBC # BLD AUTO: 3.97 X10*6/UL (ref 4–5.2)
RBC # UR STRIP.AUTO: NEGATIVE /UL
RBC #/AREA URNS AUTO: ABNORMAL /HPF
SODIUM SERPL-SCNC: 139 MMOL/L (ref 136–145)
SP GR UR STRIP.AUTO: 1.01
SQUAMOUS #/AREA URNS AUTO: ABNORMAL /HPF
UROBILINOGEN UR STRIP.AUTO-MCNC: NORMAL MG/DL
WBC # BLD AUTO: 5.9 X10*3/UL (ref 4.4–11.3)
WBC #/AREA URNS AUTO: ABNORMAL /HPF

## 2025-01-18 PROCEDURE — 81001 URINALYSIS AUTO W/SCOPE: CPT | Performed by: NURSE PRACTITIONER

## 2025-01-18 PROCEDURE — 96365 THER/PROPH/DIAG IV INF INIT: CPT | Mod: 59

## 2025-01-18 PROCEDURE — 80053 COMPREHEN METABOLIC PANEL: CPT | Performed by: NURSE PRACTITIONER

## 2025-01-18 PROCEDURE — 2500000001 HC RX 250 WO HCPCS SELF ADMINISTERED DRUGS (ALT 637 FOR MEDICARE OP): Performed by: NURSE PRACTITIONER

## 2025-01-18 PROCEDURE — 72132 CT LUMBAR SPINE W/DYE: CPT | Mod: RSC | Performed by: RADIOLOGY

## 2025-01-18 PROCEDURE — 85025 COMPLETE CBC W/AUTO DIFF WBC: CPT | Performed by: NURSE PRACTITIONER

## 2025-01-18 PROCEDURE — 72131 CT LUMBAR SPINE W/O DYE: CPT | Mod: RSC

## 2025-01-18 PROCEDURE — 83605 ASSAY OF LACTIC ACID: CPT | Performed by: NURSE PRACTITIONER

## 2025-01-18 PROCEDURE — 99285 EMERGENCY DEPT VISIT HI MDM: CPT | Mod: 25

## 2025-01-18 PROCEDURE — 36415 COLL VENOUS BLD VENIPUNCTURE: CPT | Performed by: NURSE PRACTITIONER

## 2025-01-18 PROCEDURE — 2550000001 HC RX 255 CONTRASTS: Performed by: NURSE PRACTITIONER

## 2025-01-18 PROCEDURE — 87086 URINE CULTURE/COLONY COUNT: CPT | Mod: PORLAB | Performed by: NURSE PRACTITIONER

## 2025-01-18 PROCEDURE — 2500000004 HC RX 250 GENERAL PHARMACY W/ HCPCS (ALT 636 FOR OP/ED): Performed by: NURSE PRACTITIONER

## 2025-01-18 PROCEDURE — 74177 CT ABD & PELVIS W/CONTRAST: CPT | Mod: RSC | Performed by: RADIOLOGY

## 2025-01-18 PROCEDURE — 96375 TX/PRO/DX INJ NEW DRUG ADDON: CPT

## 2025-01-18 PROCEDURE — 74177 CT ABD & PELVIS W/CONTRAST: CPT

## 2025-01-18 RX ORDER — CEFDINIR 300 MG/1
300 CAPSULE ORAL 2 TIMES DAILY
Qty: 14 CAPSULE | Refills: 0 | Status: SHIPPED | OUTPATIENT
Start: 2025-01-18 | End: 2025-01-25

## 2025-01-18 RX ORDER — ONDANSETRON HYDROCHLORIDE 2 MG/ML
4 INJECTION, SOLUTION INTRAVENOUS ONCE
Status: COMPLETED | OUTPATIENT
Start: 2025-01-18 | End: 2025-01-18

## 2025-01-18 RX ORDER — TIZANIDINE HYDROCHLORIDE 4 MG/1
4 CAPSULE, GELATIN COATED ORAL 3 TIMES DAILY PRN
Qty: 15 CAPSULE | Refills: 0 | Status: SHIPPED | OUTPATIENT
Start: 2025-01-18 | End: 2025-01-23

## 2025-01-18 RX ORDER — OXYCODONE AND ACETAMINOPHEN 5; 325 MG/1; MG/1
1 TABLET ORAL ONCE
Status: COMPLETED | OUTPATIENT
Start: 2025-01-18 | End: 2025-01-18

## 2025-01-18 RX ORDER — MORPHINE SULFATE 4 MG/ML
4 INJECTION INTRAVENOUS ONCE
Status: COMPLETED | OUTPATIENT
Start: 2025-01-18 | End: 2025-01-18

## 2025-01-18 RX ORDER — CEFTRIAXONE 2 G/50ML
2 INJECTION, SOLUTION INTRAVENOUS ONCE
Status: COMPLETED | OUTPATIENT
Start: 2025-01-18 | End: 2025-01-18

## 2025-01-18 RX ORDER — KETOROLAC TROMETHAMINE 30 MG/ML
15 INJECTION, SOLUTION INTRAMUSCULAR; INTRAVENOUS ONCE
Status: COMPLETED | OUTPATIENT
Start: 2025-01-18 | End: 2025-01-18

## 2025-01-18 RX ORDER — KETOROLAC TROMETHAMINE 10 MG/1
10 TABLET, FILM COATED ORAL EVERY 8 HOURS PRN
Qty: 15 TABLET | Refills: 0 | Status: SHIPPED | OUTPATIENT
Start: 2025-01-18 | End: 2025-01-23

## 2025-01-18 RX ADMIN — SODIUM CHLORIDE 1000 ML: 9 INJECTION, SOLUTION INTRAVENOUS at 22:07

## 2025-01-18 RX ADMIN — CEFTRIAXONE SODIUM 2 G: 2 INJECTION, SOLUTION INTRAVENOUS at 22:07

## 2025-01-18 RX ADMIN — KETOROLAC TROMETHAMINE 15 MG: 30 INJECTION, SOLUTION INTRAMUSCULAR at 19:38

## 2025-01-18 RX ADMIN — MORPHINE SULFATE 4 MG: 4 INJECTION, SOLUTION INTRAMUSCULAR; INTRAVENOUS at 19:38

## 2025-01-18 RX ADMIN — IOHEXOL 75 ML: 350 INJECTION, SOLUTION INTRAVENOUS at 21:23

## 2025-01-18 RX ADMIN — ONDANSETRON 4 MG: 2 INJECTION INTRAMUSCULAR; INTRAVENOUS at 19:38

## 2025-01-18 RX ADMIN — OXYCODONE HYDROCHLORIDE AND ACETAMINOPHEN 1 TABLET: 5; 325 TABLET ORAL at 21:54

## 2025-01-18 ASSESSMENT — LIFESTYLE VARIABLES
EVER FELT BAD OR GUILTY ABOUT YOUR DRINKING: NO
HAVE PEOPLE ANNOYED YOU BY CRITICIZING YOUR DRINKING: NO
HAVE YOU EVER FELT YOU SHOULD CUT DOWN ON YOUR DRINKING: NO
TOTAL SCORE: 0
EVER HAD A DRINK FIRST THING IN THE MORNING TO STEADY YOUR NERVES TO GET RID OF A HANGOVER: NO

## 2025-01-18 ASSESSMENT — COLUMBIA-SUICIDE SEVERITY RATING SCALE - C-SSRS
1. IN THE PAST MONTH, HAVE YOU WISHED YOU WERE DEAD OR WISHED YOU COULD GO TO SLEEP AND NOT WAKE UP?: NO
2. HAVE YOU ACTUALLY HAD ANY THOUGHTS OF KILLING YOURSELF?: NO
6. HAVE YOU EVER DONE ANYTHING, STARTED TO DO ANYTHING, OR PREPARED TO DO ANYTHING TO END YOUR LIFE?: NO

## 2025-01-18 ASSESSMENT — PAIN - FUNCTIONAL ASSESSMENT: PAIN_FUNCTIONAL_ASSESSMENT: 0-10

## 2025-01-18 ASSESSMENT — PAIN SCALES - GENERAL
PAINLEVEL_OUTOF10: 9
PAINLEVEL_OUTOF10: 8
PAINLEVEL_OUTOF10: 7

## 2025-01-18 ASSESSMENT — VISUAL ACUITY: OU: 1

## 2025-01-18 ASSESSMENT — PAIN DESCRIPTION - LOCATION
LOCATION: BACK
LOCATION: BACK

## 2025-01-18 ASSESSMENT — PAIN DESCRIPTION - PAIN TYPE: TYPE: ACUTE PAIN

## 2025-01-19 LAB — HOLD SPECIMEN: NORMAL

## 2025-01-19 NOTE — ED PROVIDER NOTES
HPI   Chief Complaint   Patient presents with    back pain       Patient presents the emergency department for evaluation of acute on chronic back pain.  She has been struggling with this recently and following with her primary care provider as well as going through physical therapy.  She does do yoga as well.  Initially her pain was improving over 3 weeks but over the last week it had worsened again.  She did have an outpatient x-ray completed on January 10.  It showed that she had some mild scoliosis with diffuse lumbar degenerative changes.  There was no sign of fracture or subluxation.  Patient denies any traumatic incident, history of spinal fracture or injections.  She does not follow with pain management.  She has been taking Advil, Tylenol PM muscle relaxant for her symptoms which is no longer helping as much.  In addition to her typical pressure at the low back, she is having fatigue, nausea without vomiting, decreased urine output with urgency and frequency and increasing back pain.  There is no associated fever, syncope, chest pain, shortness of breath, vomiting, black or bloody stools, constipation, change in vaginal secretions, calf pain, leg swelling or rash.  She has been through menopause.  She does not have a personal history of cancer.  She has not had any history of pyelonephritis, ureteral lithiasis, diverticulitis, Crohn's or endometriosis.  There is no associated loss of bowel or bladder control, saddle paresthesia, leg weakness or numbness.  Her symptoms are moderate in severity and persistent nature.      History provided by:  Patient and significant other   used: No                          [place] Coma Scale Score: 15                  Patient History   Past Medical History:   Diagnosis Date    Change in bowel habits 11/05/2023    Diarrhea 10/09/2023    Epigastric pain 04/12/2023    Incontinence of feces 11/05/2023    Rectal bleeding 08/22/2023     Past Surgical  "History:   Procedure Laterality Date    TONSILLECTOMY  04/12/2018    Tonsillectomy     Family History   Problem Relation Name Age of Onset    Lymphoma Mother      Coronary artery disease Father      Breast cancer Sister      Alzheimer's disease Maternal Grandmother      Parkinsonism Maternal Grandfather       Social History     Tobacco Use    Smoking status: Never     Passive exposure: Never    Smokeless tobacco: Never   Vaping Use    Vaping status: Former    Substances: THC, CBD    Devices: Disposable   Substance Use Topics    Alcohol use: Yes     Alcohol/week: 2.0 standard drinks of alcohol     Types: 2 Glasses of wine per week     Comment: rarely    Drug use: Not Currently     Types: Marijuana       Physical Exam   Visit Vitals  /70   Pulse 70   Temp 36.6 °C (97.9 °F)   Resp 18   Ht 1.651 m (5' 5\")   Wt 51.3 kg (113 lb)   SpO2 100%   BMI 18.80 kg/m²   OB Status Postmenopausal   Smoking Status Never   BSA 1.53 m²      Physical Exam  Vitals reviewed.   Constitutional:       General: She is not in acute distress.     Appearance: She is not toxic-appearing.      Comments: Patient changing position from chair to exam table without difficulty or assistive device.    HENT:      Head: Normocephalic and atraumatic.      Right Ear: Hearing normal.      Left Ear: Hearing normal.      Nose: Nose normal.      Mouth/Throat:      Lips: Pink.      Mouth: Mucous membranes are moist.   Eyes:      General: Vision grossly intact.   Neck:      Vascular: No JVD.      Trachea: Phonation normal.   Cardiovascular:      Rate and Rhythm: Normal rate and regular rhythm.      Pulses:           Posterior tibial pulses are 2+ on the right side and 2+ on the left side.      Heart sounds: No murmur heard.  Pulmonary:      Effort: Pulmonary effort is normal.      Breath sounds: Normal breath sounds.   Abdominal:      General: Bowel sounds are normal.      Palpations: Abdomen is soft. There is no pulsatile mass.      Tenderness: There is " abdominal tenderness in the right lower quadrant, suprapubic area and left lower quadrant. There is guarding. There is no right CVA tenderness or left CVA tenderness. Negative signs include Taylor's sign and McBurney's sign.   Genitourinary:     Comments: Pelvic exam deferred  Musculoskeletal:      Cervical back: Normal range of motion and neck supple.      Right lower leg: No edema.      Left lower leg: No edema.      Comments: No tenderness to the spine. 5/5 strength in the bilateral upper and lower extremities.  Normal sensation in the upper and lower extremities bilaterally.  Normal ankle/toe dorsiflexion and plantarflexion.  Normal knee flexion and extension.  Normal thigh abduction and adduction.  Normal inner thigh sensation.  Normal gait without foot drop or ataxia.     Skin:     General: Skin is warm and dry.      Capillary Refill: Capillary refill takes less than 2 seconds.      Coloration: Skin is not cyanotic, jaundiced or pale.      Findings: No rash or wound.   Neurological:      Mental Status: She is alert and oriented to person, place, and time.      Sensory: Sensation is intact.      Motor: Motor function is intact.      Coordination: Coordination is intact.      Gait: Gait is intact.   Psychiatric:         Behavior: Behavior is cooperative.         CT lumbar spine wo IV contrast   Final Result   Cholelithiasis, with no cholecystitis or biliary dilatation.   Unremarkable kidneys, ureters and bladder.   Mild constipation.   Straightening of the lumbar lordotic curvature suggesting muscle   spasm, without compression fracture, scoliosis or spondylolisthesis.   Minimal lumbar degenerative disc disease.   No retroperitoneal or pelvic inflammatory changes.   .   Signed by Apollo Forbes MD      CT abdomen pelvis w IV contrast   Final Result   Cholelithiasis, with no cholecystitis or biliary dilatation.   Unremarkable kidneys, ureters and bladder.   Mild constipation.   Straightening of the lumbar  lordotic curvature suggesting muscle   spasm, without compression fracture, scoliosis or spondylolisthesis.   Minimal lumbar degenerative disc disease.   No retroperitoneal or pelvic inflammatory changes.   .   Signed by Apollo Forbes MD          Labs Reviewed   COMPREHENSIVE METABOLIC PANEL - Abnormal       Result Value    Glucose 103 (*)     Sodium 139      Potassium 4.0      Chloride 107      Bicarbonate 28      Anion Gap 8 (*)     Urea Nitrogen 13      Creatinine 0.75      eGFR >90      Calcium 9.6      Albumin 4.0      Alkaline Phosphatase 52      Total Protein 6.5      AST 14      Bilirubin, Total 0.4      ALT 10     CBC WITH AUTO DIFFERENTIAL - Abnormal    WBC 5.9      nRBC 0.0      RBC 3.97 (*)     Hemoglobin 11.9 (*)     Hematocrit 35.3 (*)     MCV 89      MCH 30.0      MCHC 33.7      RDW 12.8      Platelets 297      Neutrophils % 52.6      Immature Granulocytes %, Automated 0.2      Lymphocytes % 33.1      Monocytes % 9.7      Eosinophils % 3.4      Basophils % 1.0      Neutrophils Absolute 3.08      Immature Granulocytes Absolute, Automated 0.01      Lymphocytes Absolute 1.94      Monocytes Absolute 0.57      Eosinophils Absolute 0.20      Basophils Absolute 0.06     URINALYSIS WITH REFLEX CULTURE AND MICROSCOPIC - Abnormal    Color, Urine Light-Yellow      Appearance, Urine Clear      Specific Gravity, Urine 1.015      pH, Urine 6.5      Protein, Urine NEGATIVE      Glucose, Urine Normal      Blood, Urine NEGATIVE      Ketones, Urine NEGATIVE      Bilirubin, Urine NEGATIVE      Urobilinogen, Urine Normal      Nitrite, Urine NEGATIVE      Leukocyte Esterase, Urine 75 Alexandr/uL (*)    MICROSCOPIC ONLY, URINE - Abnormal    WBC, Urine 6-10 (*)     RBC, Urine NONE      Squamous Epithelial Cells, Urine 1-9 (SPARSE)      Bacteria, Urine 1+ (*)    LACTATE - Normal    Lactate 0.5      Narrative:     Venipuncture immediately after or during the administration of Metamizole may lead to falsely low results. Testing  should be performed immediately prior to Metamizole dosing.   URINE CULTURE   URINALYSIS WITH REFLEX CULTURE AND MICROSCOPIC    Narrative:     The following orders were created for panel order Urinalysis with Reflex Culture and Microscopic.  Procedure                               Abnormality         Status                     ---------                               -----------         ------                     Urinalysis with Reflex C...[[id number]]  Abnormal            Final result               Extra Urine Gray Tube[[id number]]                            In process                   Please view results for these tests on the individual orders.   EXTRA URINE GRAY TUBE       No results found. However, due to the size of the patient record, not all encounters were searched. Please check Results Review for a complete set of results.    ED Course & MDM     Medical Decision Making  Patient presents to the emergency department for evaluation of back pain.  Differential diagnosis of degenerative disc disease, ureteral lithiasis, ascending UTI and diverticulitis to mention a few.  Plan is for baseline labs, urinalysis and CT imaging of the abdomen and pelvis with IV contrast and CT imaging of the lumbar spine without contrast.  Do not suspect cauda equina based on clinical exam, no neurologic deficits and will perform bladder scan for supportive evidence.  Will provide symptom management of morphine, Zofran and Toradol.  Patient provides consent.        ED Course as of 01/19/25 0223   Sat Jan 18, 2025 1947 Bladder Scan  Bladder Scan Volume (mL): 0 mL (post-void)     [NA]   1949 HEMOGLOBIN(!): 11.9  15.2 one year ago [NA]   1949 HEMATOCRIT(!): 35.3  44.3 one year ago [NA]   2032 WBC, Urine(!): 6-10 [NA]   2032 Bacteria, Urine(!): 1+ [NA]   2157 Patient requesting something more for pain.  Urinalysis with her urinary symptoms will be treated as UTI.  Review of previous urine cultures does not reflect any resistance or  positive cultures therefore she was ordered Rocephin while pending CT results.  Will provide a dose of oral Percocet for p.o. challenge. [NA]   Sun Jan 19, 2025   0002 Patient has tolerated IV fluids, p.o. fluids and Rocephin.  We discussed her CT results at length which I printed out for her.  Her pain has improved throughout her ED course and otherwise no change in physical exam.  Abdomen remains soft.  She is seeking better symptom management at home and plan is for discontinuation of NSAIDs and substitute with Toradol 10 mg 3 times daily as needed for 5 days, discontinuation of her muscle relaxant she has on hand at home which she believes may be Flexeril and changed to tizanidine 4 mg 3 times daily as needed which she is aware the sedative effects, range of motion exercises, ice, Salonpas patch, continue to work with physical therapy and treatment of UTI with Omnicef 300 mg 3 times twice daily for 7 days while pending urine culture result. Patient is non-toxic, not hypoxic and appropriate for this outpatient management plan which they prefer. Encouragement to arrange close follow up was discussed as well as provided in a written handout of discharge instructions. Patient was educated on signs of symptoms to watch for indicative of re-evaluation in the emergency department setting to include any worsening of current symptoms. They verbalized understanding of instructions and is amenable to this treatment plan with no social determinants of health that would obscure this plan. Patient departed in stable condition.  [NA]      ED Course User Index  [NA] Rosetta Osman, APRN-CNP         Diagnoses as of 01/19/25 0223   Acute bilateral low back pain without sciatica   Anemia, unspecified type   Urinary tract infection without hematuria, site unspecified          Your medication list        START taking these medications        Instructions Last Dose Given Next Dose Due   cefdinir 300 mg capsule  Commonly known as:  Omnicef      Take 1 capsule (300 mg) by mouth 2 times a day for 7 days.       ketorolac 10 mg tablet  Commonly known as: Toradol      Take 1 tablet (10 mg) by mouth every 8 hours if needed for moderate pain (4 - 6) or severe pain (7 - 10) for up to 5 days.       tiZANidine 4 mg capsule  Commonly known as: Zanaflex      Take 1 capsule (4 mg) by mouth 3 times a day as needed for muscle spasms for up to 5 days.              ASK your doctor about these medications        Instructions Last Dose Given Next Dose Due   Auvelity  mg tablet, IR and ER, biphasic  Generic drug: dextromethorphan-bupropion           mirtazapine 7.5 mg tablet  Commonly known as: Remeron      Take 1 tablet (7.5 mg) by mouth once daily at bedtime.                 Where to Get Your Medications        These medications were sent to [facility]/pharmacy #[id number] [address] AT [address] [address]      Phone: [phone]   cefdinir 300 mg capsule  ketorolac 10 mg tablet  tiZANidine 4 mg capsule         Procedure  None     *This report was transcribed using voice recognition software.  Every effort was made to ensure accuracy; however, inadvertent computerized transcription errors may be present.*  OMID Cloud  01/19/25         DANISH Cloud-LIDIA  01/19/25 0223

## 2025-01-21 LAB — BACTERIA UR CULT: ABNORMAL

## 2025-01-24 ENCOUNTER — APPOINTMENT (OUTPATIENT)
Dept: PHYSICAL THERAPY | Facility: HOSPITAL | Age: 55
End: 2025-01-24
Payer: MEDICARE

## 2025-01-29 DIAGNOSIS — F41.1 GENERALIZED ANXIETY DISORDER: ICD-10-CM

## 2025-01-29 RX ORDER — CLONAZEPAM 1 MG/1
1 TABLET ORAL 2 TIMES DAILY PRN
Qty: 60 TABLET | Refills: 0 | Status: SHIPPED | OUTPATIENT
Start: 2025-01-29 | End: 2025-02-28

## 2025-01-31 ENCOUNTER — APPOINTMENT (OUTPATIENT)
Dept: PHYSICAL THERAPY | Facility: HOSPITAL | Age: 55
End: 2025-01-31
Payer: MEDICARE

## 2025-02-07 ENCOUNTER — APPOINTMENT (OUTPATIENT)
Dept: INTEGRATIVE MEDICINE | Facility: CLINIC | Age: 55
End: 2025-02-07
Payer: MEDICARE

## 2025-02-20 ENCOUNTER — APPOINTMENT (OUTPATIENT)
Dept: PRIMARY CARE | Facility: CLINIC | Age: 55
End: 2025-02-20
Payer: MEDICARE

## 2025-02-20 VITALS
HEART RATE: 99 BPM | BODY MASS INDEX: 18.16 KG/M2 | DIASTOLIC BLOOD PRESSURE: 62 MMHG | TEMPERATURE: 96.9 F | SYSTOLIC BLOOD PRESSURE: 102 MMHG | RESPIRATION RATE: 16 BRPM | OXYGEN SATURATION: 97 % | WEIGHT: 109 LBS | HEIGHT: 65 IN

## 2025-02-20 DIAGNOSIS — D64.9 HEMOGLOBIN LOW: ICD-10-CM

## 2025-02-20 DIAGNOSIS — M54.50 CHRONIC BILATERAL LOW BACK PAIN WITHOUT SCIATICA: Primary | ICD-10-CM

## 2025-02-20 DIAGNOSIS — G89.29 CHRONIC BILATERAL LOW BACK PAIN WITHOUT SCIATICA: Primary | ICD-10-CM

## 2025-02-20 DIAGNOSIS — Z13.220 ENCOUNTER FOR LIPID SCREENING FOR CARDIOVASCULAR DISEASE: ICD-10-CM

## 2025-02-20 DIAGNOSIS — Z13.6 ENCOUNTER FOR LIPID SCREENING FOR CARDIOVASCULAR DISEASE: ICD-10-CM

## 2025-02-20 PROCEDURE — 99213 OFFICE O/P EST LOW 20 MIN: CPT | Performed by: STUDENT IN AN ORGANIZED HEALTH CARE EDUCATION/TRAINING PROGRAM

## 2025-02-20 PROCEDURE — 1036F TOBACCO NON-USER: CPT | Performed by: STUDENT IN AN ORGANIZED HEALTH CARE EDUCATION/TRAINING PROGRAM

## 2025-02-20 PROCEDURE — 3008F BODY MASS INDEX DOCD: CPT | Performed by: STUDENT IN AN ORGANIZED HEALTH CARE EDUCATION/TRAINING PROGRAM

## 2025-02-20 PROCEDURE — G2211 COMPLEX E/M VISIT ADD ON: HCPCS | Performed by: STUDENT IN AN ORGANIZED HEALTH CARE EDUCATION/TRAINING PROGRAM

## 2025-02-20 RX ORDER — KETOROLAC TROMETHAMINE 10 MG/1
10 TABLET, FILM COATED ORAL 2 TIMES DAILY PRN
Qty: 20 TABLET | Refills: 0 | Status: SHIPPED | OUTPATIENT
Start: 2025-02-20

## 2025-02-20 RX ORDER — METHOCARBAMOL 500 MG/1
500 TABLET, FILM COATED ORAL NIGHTLY PRN
Qty: 30 TABLET | Refills: 0 | Status: SHIPPED | OUTPATIENT
Start: 2025-02-20 | End: 2025-04-21

## 2025-02-20 SDOH — ECONOMIC STABILITY: FOOD INSECURITY: WITHIN THE PAST 12 MONTHS, YOU WORRIED THAT YOUR FOOD WOULD RUN OUT BEFORE YOU GOT MONEY TO BUY MORE.: NEVER TRUE

## 2025-02-20 SDOH — ECONOMIC STABILITY: FOOD INSECURITY: WITHIN THE PAST 12 MONTHS, THE FOOD YOU BOUGHT JUST DIDN'T LAST AND YOU DIDN'T HAVE MONEY TO GET MORE.: NEVER TRUE

## 2025-02-20 ASSESSMENT — ENCOUNTER SYMPTOMS
SLEEP DISTURBANCE: 0
COLOR CHANGE: 0
DIARRHEA: 0
CHILLS: 0
CONFUSION: 0
NAUSEA: 0
VOMITING: 0
FEVER: 0
ARTHRALGIAS: 1
WHEEZING: 0
ABDOMINAL PAIN: 0
UNEXPECTED WEIGHT CHANGE: 0
FATIGUE: 0
HEADACHES: 0
PALPITATIONS: 0
COUGH: 0
BACK PAIN: 1
DIZZINESS: 0
CONSTIPATION: 0
SHORTNESS OF BREATH: 0

## 2025-02-20 ASSESSMENT — PATIENT HEALTH QUESTIONNAIRE - PHQ9
3. TROUBLE FALLING OR STAYING ASLEEP: NEARLY EVERY DAY
1. LITTLE INTEREST OR PLEASURE IN DOING THINGS: NEARLY EVERY DAY
7. TROUBLE CONCENTRATING ON THINGS, SUCH AS READING THE NEWSPAPER OR WATCHING TELEVISION: NEARLY EVERY DAY
8. MOVING OR SPEAKING SO SLOWLY THAT OTHER PEOPLE COULD HAVE NOTICED. OR THE OPPOSITE, BEING SO FIGETY OR RESTLESS THAT YOU HAVE BEEN MOVING AROUND A LOT MORE THAN USUAL: NEARLY EVERY DAY
SUM OF ALL RESPONSES TO PHQ9 QUESTIONS 1 & 2: 6
4. FEELING TIRED OR HAVING LITTLE ENERGY: NEARLY EVERY DAY
6. FEELING BAD ABOUT YOURSELF - OR THAT YOU ARE A FAILURE OR HAVE LET YOURSELF OR YOUR FAMILY DOWN: NEARLY EVERY DAY
5. POOR APPETITE OR OVEREATING: NEARLY EVERY DAY
9. THOUGHTS THAT YOU WOULD BE BETTER OFF DEAD, OR OF HURTING YOURSELF: NEARLY EVERY DAY
2. FEELING DOWN, DEPRESSED OR HOPELESS: NEARLY EVERY DAY
SUM OF ALL RESPONSES TO PHQ QUESTIONS 1-9: 27

## 2025-02-20 ASSESSMENT — LIFESTYLE VARIABLES
SKIP TO QUESTIONS 9-10: 1
AUDIT-C TOTAL SCORE: 1
HOW OFTEN DO YOU HAVE A DRINK CONTAINING ALCOHOL: MONTHLY OR LESS
HOW OFTEN DO YOU HAVE SIX OR MORE DRINKS ON ONE OCCASION: NEVER
HOW MANY STANDARD DRINKS CONTAINING ALCOHOL DO YOU HAVE ON A TYPICAL DAY: 1 OR 2

## 2025-02-20 NOTE — PROGRESS NOTES
"Subjective   Patient ID: Dolores Salgado is a 54 y.o. female who presents for Follow-up (Pt c/o worsening depression.  Pt is having trouble sleeping due to back pain. Pt had MRI, CT and xray of back).    HPI   She is here for follow-up visit. Reports she continue having lower back, was in ER 1/19/25; she was put on toradol & Zanaflex; feels toradol helping more and able to sleep. Also following with pain & spine center at [facility]; tried steroid shot w/o much help, has follow up appt with them tmr. Also reports her depression is worsening d/t ongoing back pain, following with psych, currently not taking any meds other than Klonopin    Review of Systems   Constitutional:  Negative for chills, fatigue, fever and unexpected weight change.   HENT: Negative.     Respiratory:  Negative for cough, shortness of breath and wheezing.    Cardiovascular:  Negative for chest pain, palpitations and leg swelling.   Gastrointestinal:  Negative for abdominal pain, constipation, diarrhea, nausea and vomiting.   Musculoskeletal:  Positive for arthralgias and back pain.   Skin:  Negative for color change and rash.   Neurological:  Negative for dizziness and headaches.   Psychiatric/Behavioral:  Negative for behavioral problems, confusion, self-injury, sleep disturbance and suicidal ideas.        Objective   /62 (BP Location: Right arm, Patient Position: Sitting, BP Cuff Size: Adult)   Pulse 99   Temp 36.1 °C (96.9 °F) (Temporal)   Resp 16   Ht 1.651 m (5' 5\")   Wt 49.4 kg (109 lb)   SpO2 97%   BMI 18.14 kg/m²     Physical Exam  Vitals and nursing note reviewed.   Constitutional:       Appearance: Normal appearance.   Cardiovascular:      Rate and Rhythm: Normal rate and regular rhythm.      Pulses: Normal pulses.      Heart sounds: Normal heart sounds.   Pulmonary:      Effort: Pulmonary effort is normal.      Breath sounds: Normal breath sounds.   Abdominal:      General: Abdomen is flat. Bowel sounds are normal.      Palpations: " Abdomen is soft.   Musculoskeletal:         General: Normal range of motion.      Comments: Mild tt[p on the R lower back, no swelling/rash noted. SLR neg bl in LE. Neurovasc intact.    Neurological:      General: No focal deficit present.      Mental Status: She is alert.      Gait: Gait normal.   Psychiatric:         Mood and Affect: Mood normal.         Behavior: Behavior normal.       Assessment/Plan   She is here for follow-up visit.  Noted that she continue to have lower back pain and MRI LS showing mild degenerative disc disease, February 8, 2025.  Highly encouraged to continue following with the pain/spine team as usual and has appointment tomorrow.  Also recommend to discuss pain meds for further management.  For now continue Toradol and start Robaxin 1-2 times daily as below.  Recommend heating packs/icing few times daily.  Also noted to have intermittent mild to moderate depressive symptoms likely triggered by ongoing back pain VS others.  Advised to discuss with his psych for further evaluation and management as needed.  She may restart Remeron as prescribed at last visit for help with insomnia VS recommend to discuss with psych team for further management.  Problem List Items Addressed This Visit             ICD-10-CM    Chronic bilateral low back pain without sciatica - Primary M54.50, G89.29    Relevant Medications    ketorolac (Toradol) 10 mg tablet    methocarbamol (Robaxin) 500 mg tablet     Other Visit Diagnoses         Codes    Encounter for lipid screening for cardiovascular disease     Z13.220, Z13.6    Relevant Orders    Lipid Panel    Hemoglobin low     D64.9    Relevant Orders    CBC and Auto Differential          RTC as scheduled for MCR/follow-up    This note was partially generated using the Dragon Voice recognition system. There may be some incorrect wording, spelling and/or spelling errors or punctuation errors that were not corrected prior to committing the note to the medical record.       Nayan Jones MD    [place], [facility]

## 2025-02-27 ENCOUNTER — TELEPHONE (OUTPATIENT)
Dept: BEHAVIORAL HEALTH | Facility: CLINIC | Age: 55
End: 2025-02-27

## 2025-02-27 DIAGNOSIS — F41.1 GENERALIZED ANXIETY DISORDER: ICD-10-CM

## 2025-02-27 RX ORDER — GABAPENTIN 300 MG/1
300 CAPSULE ORAL 3 TIMES DAILY
Qty: 90 CAPSULE | Refills: 2 | Status: SHIPPED | OUTPATIENT
Start: 2025-02-27 | End: 2025-05-28

## 2025-03-14 ENCOUNTER — APPOINTMENT (OUTPATIENT)
Dept: INTEGRATIVE MEDICINE | Facility: CLINIC | Age: 55
End: 2025-03-14
Payer: MEDICARE

## 2025-03-19 ENCOUNTER — OFFICE VISIT (OUTPATIENT)
Dept: BEHAVIORAL HEALTH | Facility: CLINIC | Age: 55
End: 2025-03-19
Payer: MEDICARE

## 2025-03-19 DIAGNOSIS — F41.1 GENERALIZED ANXIETY DISORDER: Primary | ICD-10-CM

## 2025-03-19 DIAGNOSIS — F33.3 SEVERE EPISODE OF RECURRENT MAJOR DEPRESSIVE DISORDER, WITH PSYCHOTIC FEATURES (MULTI): ICD-10-CM

## 2025-03-19 PROCEDURE — 99214 OFFICE O/P EST MOD 30 MIN: CPT | Mod: AM | Performed by: STUDENT IN AN ORGANIZED HEALTH CARE EDUCATION/TRAINING PROGRAM

## 2025-03-19 PROCEDURE — 99214 OFFICE O/P EST MOD 30 MIN: CPT | Performed by: STUDENT IN AN ORGANIZED HEALTH CARE EDUCATION/TRAINING PROGRAM

## 2025-03-19 RX ORDER — QUETIAPINE FUMARATE 100 MG/1
TABLET, FILM COATED ORAL
Qty: 49 TABLET | Refills: 0 | Status: SHIPPED | OUTPATIENT
Start: 2025-03-19 | End: 2025-04-16

## 2025-03-19 RX ORDER — TRAZODONE HYDROCHLORIDE 50 MG/1
50 TABLET ORAL NIGHTLY
Qty: 30 TABLET | Refills: 0 | Status: SHIPPED | OUTPATIENT
Start: 2025-03-19 | End: 2025-03-26 | Stop reason: SDUPTHER

## 2025-03-19 NOTE — PROGRESS NOTES
Subjective    All Individuals Present: Patient and Provider (Encounter Provider)     ID: Dolores Salgado is a 54 y.o. female with history of severe MDD and PTSD.     Interval History/HPI/PFSH:  Still struggling with some racing thoughts and feeling overwhelmed, paralyzed by anxiety. Trying to focus on psychotherapy as way past this and doing deep dives in therapy.    Remains with dense neurovegetative symptoms, no current SI.     Denies any current HI, AVH.      Medication side effects:  sedation , sexual dysfunction     Review of Systems  Constitutional: Positive for fatigue, Negative for fevers  Psychiatric: Positive for anxiety, decreased appetite, depression, fatigue, irritability, learning difficulty, loss of interest in favorite activities, mood swings, and sleep disturbance, Negative for aggressive behavior, excessive alcohol consumption, illegal drug usage, and tobacco use  Neurological: Positive for memory problems, Negative for gait problems, headaches, seizures, speech problems, and weakness   Other: vaginismus    Objective   There were no vitals taken for this visit.  Wt Readings from Last 4 Encounters:   02/20/25 49.4 kg (109 lb)   01/18/25 51.3 kg (113 lb)   11/07/24 48.5 kg (107 lb)   10/10/24 48.1 kg (106 lb)       Mental Status Exam  General Appearance: Fairly groomed.  Attitude/Behavior: Cooperative, conversant, engaged, and with good eye contact.  Motor: Psychomotor retardation.  Speech: Slow speech  Gait/Station: Within normal limits  Mood: depressed.  Affect: Dysphoric, constricted  and Congruent with mood and topic of conversation  Thought Process: Linear, goal directed, Perseverative  Thought Associations: No loosening of associations  Thought Content:  chronic SI  Sensorium: Alert and oriented to person, place, time and situation  Insight: Intact, as evidenced by awareness of symptom patterns  Judgment: Intact  Cognition: Attention: Mild impairment in attention, Fund of Knowledge: Good, Language:  Word finding difficulties, and Orientation: Ox4  Testing:  see prior neuropsychology/neuropsychiatry notes.    Laboratory/Imaging/Diagnostic Tests       Assessment/Plan   Overall Formulation and Differential Diagnosis:  Dolores Salgado is a 54 y.o. female who meets criteria for severe MDD and PTSD.  Interval Assessment:  History of MDD for years with significant worsening depressed mood, especially secondary to grief of partner committing suicide 2020. Has had numerous psychotropic trials, recently started on vilazodone 10 mg, recently on vortioxetine. Has also been placed on Li 600 mg as depression augmentation given SI, but decision to not increase further related to current poor PO intake related to depression/neurovegatitve status and diminished IADLs. Has had multiple rounds of ECT on two separate occasions with no discernible benefit. Current SI passive, no plan/intent, able to contract for safety.     *In the interim, mood no longer dipping. Having adverse sexual side effects from medications, working towards simplification. Trying to consolidate medications to help reduce side effect profile, while monitoring to make sure no exacerbation in mood. Able to contract for safety.     Dx:  -MDD, recurrent, severe, with psychotic features  -PTSD  -illness anxiety disorder  -complicated bereavement  -r/o cognitive dysfunction d/t vascular etiology     Plan:    -Li discontinued d/t toxicity  -continue clonazepam to 1 mg 1-2x/day prn panic  -completed esketamine  -continue individual therapy  -RTC 4 week     Risk Assessment:  Imminent Risk of Suicide or Serious Self-Injury: Medium Risk - Risk factors include: {Depression, History of suicide attempt , History of trauma or abuse , Local clusters of suicide or exposure to others who have  by suicide , Loss (relational, social, occupational, financial) , Medical illness comorbidity , Sense of isolation , Severe anxiety, and Suicidal ideation , Protective Factors  include: Future-oriented talk , Willingness to seek help and support , Skills in problem solving, conflict resolution, and nonviolent handling of disputes, Access to a variety of clinical interventions , Receiving and engaged in care for mental, physical, and substance use disorders , History of adhering to treatment recommendations and/or prescribed medication regimen , Support through ongoing medical and mental healthcare relationships , and Restricted access to firearms or other lethal means of suicide   Imminent Risk of Violence or Homicide: Low Risk - Risk factors include: No significant risk factors identified on screening. Protective factors include: Lack of known history of harm to others , Lack of known history of violent ideation , Lack of known access to firearms , and Interpersonal competence   Treatment Plan:  There are no recently modified care plans to display for this patient.      Attestation Statements   Number of Minutes Spent Performing Evaluation & Management (E&M): 30

## 2025-03-20 ENCOUNTER — TELEPHONE (OUTPATIENT)
Dept: BEHAVIORAL HEALTH | Facility: CLINIC | Age: 55
End: 2025-03-20
Payer: MEDICARE

## 2025-03-20 DIAGNOSIS — F33.3 SEVERE EPISODE OF RECURRENT MAJOR DEPRESSIVE DISORDER, WITH PSYCHOTIC FEATURES (MULTI): ICD-10-CM

## 2025-03-20 RX ORDER — QUETIAPINE FUMARATE 25 MG/1
25 TABLET, FILM COATED ORAL NIGHTLY
Qty: 30 TABLET | Refills: 0 | Status: SHIPPED | OUTPATIENT
Start: 2025-03-20 | End: 2025-04-19

## 2025-03-25 ENCOUNTER — TELEPHONE (OUTPATIENT)
Dept: BEHAVIORAL HEALTH | Facility: HOSPITAL | Age: 55
End: 2025-03-25

## 2025-03-26 ENCOUNTER — APPOINTMENT (OUTPATIENT)
Dept: BEHAVIORAL HEALTH | Facility: CLINIC | Age: 55
End: 2025-03-26
Payer: MEDICARE

## 2025-03-26 DIAGNOSIS — F41.1 GENERALIZED ANXIETY DISORDER: ICD-10-CM

## 2025-03-26 DIAGNOSIS — F33.3 SEVERE EPISODE OF RECURRENT MAJOR DEPRESSIVE DISORDER, WITH PSYCHOTIC FEATURES (MULTI): ICD-10-CM

## 2025-03-26 RX ORDER — TRAZODONE HYDROCHLORIDE 50 MG/1
50 TABLET ORAL NIGHTLY
Qty: 30 TABLET | Refills: 0 | Status: SHIPPED | OUTPATIENT
Start: 2025-03-26 | End: 2025-04-25

## 2025-03-26 RX ORDER — CLONAZEPAM 1 MG/1
1 TABLET ORAL 2 TIMES DAILY PRN
Qty: 60 TABLET | Refills: 0 | Status: SHIPPED | OUTPATIENT
Start: 2025-03-26 | End: 2025-04-25

## 2025-03-26 RX ORDER — MIRTAZAPINE 7.5 MG/1
7.5 TABLET, FILM COATED ORAL NIGHTLY
Qty: 30 TABLET | Refills: 0 | Status: SHIPPED | OUTPATIENT
Start: 2025-03-26 | End: 2025-04-25

## 2025-03-28 ENCOUNTER — APPOINTMENT (OUTPATIENT)
Dept: INTEGRATIVE MEDICINE | Facility: CLINIC | Age: 55
End: 2025-03-28
Payer: MEDICARE

## 2025-04-09 ENCOUNTER — OFFICE VISIT (OUTPATIENT)
Dept: BEHAVIORAL HEALTH | Facility: CLINIC | Age: 55
End: 2025-04-09
Payer: MEDICARE

## 2025-04-09 DIAGNOSIS — F33.3 SEVERE EPISODE OF RECURRENT MAJOR DEPRESSIVE DISORDER, WITH PSYCHOTIC FEATURES (MULTI): ICD-10-CM

## 2025-04-09 DIAGNOSIS — F90.9 ATTENTION DEFICIT HYPERACTIVITY DISORDER (ADHD), UNSPECIFIED ADHD TYPE: ICD-10-CM

## 2025-04-09 PROCEDURE — 99214 OFFICE O/P EST MOD 30 MIN: CPT | Mod: GT,AM | Performed by: STUDENT IN AN ORGANIZED HEALTH CARE EDUCATION/TRAINING PROGRAM

## 2025-04-09 PROCEDURE — 99214 OFFICE O/P EST MOD 30 MIN: CPT | Performed by: STUDENT IN AN ORGANIZED HEALTH CARE EDUCATION/TRAINING PROGRAM

## 2025-04-09 RX ORDER — LITHIUM CARBONATE 150 MG/1
150 CAPSULE ORAL 2 TIMES DAILY
Qty: 60 CAPSULE | Refills: 1 | Status: SHIPPED | OUTPATIENT
Start: 2025-04-09 | End: 2025-06-08

## 2025-04-09 RX ORDER — DEXTROAMPHETAMINE SACCHARATE, AMPHETAMINE ASPARTATE MONOHYDRATE, DEXTROAMPHETAMINE SULFATE AND AMPHETAMINE SULFATE 2.5; 2.5; 2.5; 2.5 MG/1; MG/1; MG/1; MG/1
10 CAPSULE, EXTENDED RELEASE ORAL DAILY
Qty: 30 CAPSULE | Refills: 0 | Status: SHIPPED | OUTPATIENT
Start: 2025-04-09 | End: 2025-04-11

## 2025-04-09 NOTE — PROGRESS NOTES
Subjective    All Individuals Present: Patient and Provider (Encounter Provider)     ID: Dolores Salgado is a 54 y.o. female with history of severe MDD and PTSD.     Interval History/HPI/PFSH:    Hospitalized at [facility] but no changes made, felt disengaged from groups and did not find milieu therapeutic. Feeling more desperate to feel better, terrified of repeating her last depressive episode for years.    Remains with dense neurovegetative symptoms. Passive SI, able to contract for safety. Willing to continue to try medications before submitting to total despair.     Denies any current HI, AVH.      Medication side effects: None Reported     Review of Systems  Constitutional: Positive for fatigue, Negative for fevers  Psychiatric: Positive for anxiety, decreased appetite, depression, fatigue, irritability, learning difficulty, loss of interest in favorite activities, mood swings, and sleep disturbance, Negative for aggressive behavior, excessive alcohol consumption, illegal drug usage, and tobacco use  Neurological: Positive for memory problems, Negative for gait problems, headaches, seizures, speech problems, and weakness   Other: vaginismus    Objective   There were no vitals taken for this visit.  Wt Readings from Last 4 Encounters:   02/20/25 49.4 kg (109 lb)   01/18/25 51.3 kg (113 lb)   11/07/24 48.5 kg (107 lb)   10/10/24 48.1 kg (106 lb)       Mental Status Exam  General Appearance: Fairly groomed.  Attitude/Behavior:  More nihilistic and Difficult to engage.  Motor: Psychomotor retardation.  Speech: Slow speech  Gait/Station: Within normal limits  Mood: depressed.  Affect: Dysphoric, constricted  and Congruent with mood and topic of conversation  Thought Process: Linear, goal directed, Perseverative  Thought Associations: No loosening of associations  Thought Content:  chronic SI , worsening worthlessness/nihilism  Sensorium: Alert and oriented to person, place, time and situation  Insight: Intact, as  evidenced by awareness of symptom patterns  Judgment: Intact  Cognition: Attention: Mild impairment in attention, Fund of Knowledge: Good, Language: Word finding difficulties, and Orientation: Ox4  Testing:  see prior neuropsychology/neuropsychiatry notes.    Laboratory/Imaging/Diagnostic Tests       Assessment/Plan   Overall Formulation and Differential Diagnosis:  Dolores Salgado is a 54 y.o. female who meets criteria for severe MDD and PTSD.  Interval Assessment:  History of MDD for years with significant worsening depressed mood, especially secondary to grief of partner committing suicide 6/2020. Has had numerous psychotropic trials, recently started on vilazodone 10 mg, recently on vortioxetine. Has also been placed on Li 600 mg as depression augmentation given SI, but decision to not increase further related to current poor PO intake related to depression/neurovegatitve status and diminished IADLs. Has had multiple rounds of ECT on two separate occasions with no discernible benefit. Current SI passive, no plan/intent, able to contract for safety.     *In the interim, progressively more depressed past few months, coming to a head with unclear provocation. Adding lithium for suicidality and low-dose stimulant for mood augmentation. Able to contract for safety.     Dx:  -MDD, recurrent, severe, with psychotic features  -PTSD  -illness anxiety disorder  -complicated bereavement  -r/o cognitive dysfunction d/t vascular etiology     Plan:    -continue quetiapine 50 mg PO at bedtime, titrate to 200 mg at bedtime for mood and sleep  -cautiously resume lithium 150 mg PO BID for suicidality  -start Adderall XR 10 mg PO daily for mood augmentation, monitor psychosis  -continue clonazepam to 1 mg 1-2x/day prn panic  -completed esketamine  -continue individual therapy  -RTC 1 week     Risk Assessment:  Imminent Risk of Suicide or Serious Self-Injury: Medium Risk - Risk factors include: {Depression, History of suicide attempt ,  History of trauma or abuse , Local clusters of suicide or exposure to others who have  by suicide , Loss (relational, social, occupational, financial) , Medical illness comorbidity , Sense of isolation , Severe anxiety, and Suicidal ideation , Protective Factors include: Future-oriented talk , Willingness to seek help and support , Skills in problem solving, conflict resolution, and nonviolent handling of disputes, Access to a variety of clinical interventions , Receiving and engaged in care for mental, physical, and substance use disorders , History of adhering to treatment recommendations and/or prescribed medication regimen , Support through ongoing medical and mental healthcare relationships , and Restricted access to firearms or other lethal means of suicide   Imminent Risk of Violence or Homicide: Low Risk - Risk factors include: No significant risk factors identified on screening. Protective factors include: Lack of known history of harm to others , Lack of known history of violent ideation , Lack of known access to firearms , and Interpersonal competence   Treatment Plan:  There are no recently modified care plans to display for this patient.      Attestation Statements   Number of Minutes Spent Performing Evaluation & Management (E&M): 30

## 2025-04-11 DIAGNOSIS — F90.9 ATTENTION DEFICIT HYPERACTIVITY DISORDER (ADHD), UNSPECIFIED ADHD TYPE: ICD-10-CM

## 2025-04-11 DIAGNOSIS — F99 INSOMNIA DUE TO OTHER MENTAL DISORDER: ICD-10-CM

## 2025-04-11 DIAGNOSIS — F33.3 SEVERE EPISODE OF RECURRENT MAJOR DEPRESSIVE DISORDER, WITH PSYCHOTIC FEATURES (MULTI): ICD-10-CM

## 2025-04-11 DIAGNOSIS — F51.05 INSOMNIA DUE TO OTHER MENTAL DISORDER: ICD-10-CM

## 2025-04-11 DIAGNOSIS — G47.26 PHASE-SHIFT DISRUPTION OF 24 HOUR SLEEP-WAKE CYCLE: ICD-10-CM

## 2025-04-11 RX ORDER — RAMELTEON 8 MG/1
8 TABLET ORAL NIGHTLY
Qty: 30 TABLET | Refills: 11 | Status: SHIPPED | OUTPATIENT
Start: 2025-04-11 | End: 2026-04-11

## 2025-04-11 RX ORDER — DOXEPIN HYDROCHLORIDE 10 MG/1
10 CAPSULE ORAL NIGHTLY
Qty: 30 CAPSULE | Refills: 11 | Status: SHIPPED | OUTPATIENT
Start: 2025-04-11 | End: 2026-04-11

## 2025-04-11 RX ORDER — DEXTROAMPHETAMINE SACCHARATE, AMPHETAMINE ASPARTATE, DEXTROAMPHETAMINE SULFATE AND AMPHETAMINE SULFATE 1.25; 1.25; 1.25; 1.25 MG/1; MG/1; MG/1; MG/1
5 TABLET ORAL 2 TIMES DAILY
Qty: 60 TABLET | Refills: 0 | Status: SHIPPED | OUTPATIENT
Start: 2025-04-11 | End: 2025-05-11

## 2025-04-17 DIAGNOSIS — F33.3 SEVERE EPISODE OF RECURRENT MAJOR DEPRESSIVE DISORDER, WITH PSYCHOTIC FEATURES (MULTI): ICD-10-CM

## 2025-04-17 DIAGNOSIS — F31.9 BIPOLAR DEPRESSION (MULTI): ICD-10-CM

## 2025-04-17 RX ORDER — OLANZAPINE 5 MG/1
2.5 TABLET, FILM COATED ORAL 2 TIMES DAILY
Qty: 30 TABLET | Refills: 1 | Status: SHIPPED | OUTPATIENT
Start: 2025-04-17 | End: 2025-04-24 | Stop reason: SINTOL

## 2025-04-17 RX ORDER — OLANZAPINE 2.5 MG/1
2.5 TABLET, FILM COATED ORAL 3 TIMES DAILY
Qty: 90 TABLET | Refills: 1 | Status: SHIPPED | OUTPATIENT
Start: 2025-04-17 | End: 2025-04-17 | Stop reason: SDUPTHER

## 2025-04-22 ENCOUNTER — APPOINTMENT (OUTPATIENT)
Dept: PRIMARY CARE | Facility: CLINIC | Age: 55
End: 2025-04-22
Payer: MEDICARE

## 2025-04-22 VITALS
TEMPERATURE: 97.3 F | DIASTOLIC BLOOD PRESSURE: 70 MMHG | RESPIRATION RATE: 15 BRPM | SYSTOLIC BLOOD PRESSURE: 104 MMHG | HEIGHT: 65 IN | HEART RATE: 99 BPM | BODY MASS INDEX: 17.99 KG/M2 | WEIGHT: 108 LBS | OXYGEN SATURATION: 98 %

## 2025-04-22 DIAGNOSIS — Z71.89 ACP (ADVANCE CARE PLANNING): ICD-10-CM

## 2025-04-22 DIAGNOSIS — Z00.00 ROUTINE GENERAL MEDICAL EXAMINATION AT HEALTH CARE FACILITY: Primary | ICD-10-CM

## 2025-04-22 DIAGNOSIS — Z12.31 ENCOUNTER FOR SCREENING MAMMOGRAM FOR BREAST CANCER: ICD-10-CM

## 2025-04-22 PROCEDURE — 99497 ADVNCD CARE PLAN 30 MIN: CPT | Performed by: STUDENT IN AN ORGANIZED HEALTH CARE EDUCATION/TRAINING PROGRAM

## 2025-04-22 PROCEDURE — 1036F TOBACCO NON-USER: CPT | Performed by: STUDENT IN AN ORGANIZED HEALTH CARE EDUCATION/TRAINING PROGRAM

## 2025-04-22 PROCEDURE — G0439 PPPS, SUBSEQ VISIT: HCPCS | Performed by: STUDENT IN AN ORGANIZED HEALTH CARE EDUCATION/TRAINING PROGRAM

## 2025-04-22 PROCEDURE — 3008F BODY MASS INDEX DOCD: CPT | Performed by: STUDENT IN AN ORGANIZED HEALTH CARE EDUCATION/TRAINING PROGRAM

## 2025-04-22 SDOH — ECONOMIC STABILITY: FOOD INSECURITY: WITHIN THE PAST 12 MONTHS, THE FOOD YOU BOUGHT JUST DIDN'T LAST AND YOU DIDN'T HAVE MONEY TO GET MORE.: NEVER TRUE

## 2025-04-22 SDOH — ECONOMIC STABILITY: FOOD INSECURITY: WITHIN THE PAST 12 MONTHS, YOU WORRIED THAT YOUR FOOD WOULD RUN OUT BEFORE YOU GOT MONEY TO BUY MORE.: NEVER TRUE

## 2025-04-22 ASSESSMENT — PATIENT HEALTH QUESTIONNAIRE - PHQ9
4. FEELING TIRED OR HAVING LITTLE ENERGY: NOT AT ALL
8. MOVING OR SPEAKING SO SLOWLY THAT OTHER PEOPLE COULD HAVE NOTICED. OR THE OPPOSITE, BEING SO FIGETY OR RESTLESS THAT YOU HAVE BEEN MOVING AROUND A LOT MORE THAN USUAL: MORE THAN HALF THE DAYS
10. IF YOU CHECKED OFF ANY PROBLEMS, HOW DIFFICULT HAVE THESE PROBLEMS MADE IT FOR YOU TO DO YOUR WORK, TAKE CARE OF THINGS AT HOME, OR GET ALONG WITH OTHER PEOPLE: SOMEWHAT DIFFICULT
1. LITTLE INTEREST OR PLEASURE IN DOING THINGS: MORE THAN HALF THE DAYS
6. FEELING BAD ABOUT YOURSELF - OR THAT YOU ARE A FAILURE OR HAVE LET YOURSELF OR YOUR FAMILY DOWN: MORE THAN HALF THE DAYS
7. TROUBLE CONCENTRATING ON THINGS, SUCH AS READING THE NEWSPAPER OR WATCHING TELEVISION: NEARLY EVERY DAY
5. POOR APPETITE OR OVEREATING: MORE THAN HALF THE DAYS
SUM OF ALL RESPONSES TO PHQ9 QUESTIONS 1 & 2: 4
3. TROUBLE FALLING OR STAYING ASLEEP: NEARLY EVERY DAY
9. THOUGHTS THAT YOU WOULD BE BETTER OFF DEAD, OR OF HURTING YOURSELF: NOT AT ALL
SUM OF ALL RESPONSES TO PHQ QUESTIONS 1-9: 16
2. FEELING DOWN, DEPRESSED OR HOPELESS: MORE THAN HALF THE DAYS

## 2025-04-22 ASSESSMENT — ENCOUNTER SYMPTOMS
OCCASIONAL FEELINGS OF UNSTEADINESS: 0
CHILLS: 0
DIZZINESS: 0
NAUSEA: 0
ABDOMINAL PAIN: 0
CONFUSION: 0
WHEEZING: 0
MUSCULOSKELETAL NEGATIVE: 1
VOMITING: 0
HEADACHES: 0
PALPITATIONS: 0
COLOR CHANGE: 0
SHORTNESS OF BREATH: 0
FEVER: 0
CONSTIPATION: 0
UNEXPECTED WEIGHT CHANGE: 0
DIARRHEA: 0
FATIGUE: 0
COUGH: 0
DEPRESSION: 0
LOSS OF SENSATION IN FEET: 0

## 2025-04-22 ASSESSMENT — ACTIVITIES OF DAILY LIVING (ADL)
MANAGING_FINANCES: NEEDS ASSISTANCE
DOING_HOUSEWORK: NEEDS ASSISTANCE
GROCERY_SHOPPING: NEEDS ASSISTANCE
TAKING_MEDICATION: NEEDS ASSISTANCE
BATHING: INDEPENDENT
DRESSING: INDEPENDENT

## 2025-04-22 ASSESSMENT — LIFESTYLE VARIABLES
HOW MANY STANDARD DRINKS CONTAINING ALCOHOL DO YOU HAVE ON A TYPICAL DAY: 1 OR 2
AUDIT-C TOTAL SCORE: 1
HOW OFTEN DO YOU HAVE A DRINK CONTAINING ALCOHOL: MONTHLY OR LESS
HOW OFTEN DO YOU HAVE SIX OR MORE DRINKS ON ONE OCCASION: NEVER
SKIP TO QUESTIONS 9-10: 1

## 2025-04-22 ASSESSMENT — PAIN SCALES - GENERAL: PAINLEVEL_OUTOF10: 0-NO PAIN

## 2025-04-22 NOTE — PROGRESS NOTES
Subjective   Patient ID: Dolores Salgado is a 54 y.o. female who presents for Medicare Annual Wellness Visit Subsequent ([facility], would like to fill out DNR forms. ).    Subjective   Reason for Visit: Dolores Salgado is an 54 y.o. female here for a Medicare Wellness visit. Reports she is doing okay, no illness reported. She is following with psych and also on multiple meds, reports overall improving/moving in right directions.   States she wants to fill out DNR forms; reports she has discussed with her family and have chosen to go with DNR comfort care-arrest; would like to do paper work today.     Past Medical, Surgical, and Family History reviewed and updated in chart.    Reviewed all medications by prescribing practitioner or clinical pharmacist (such as prescriptions, OTCs, herbal therapies and supplements) and documented in the medical record.    HPI  #HM stuffs  Screening tests:  - Mammogram (age 40-74): 10/2024; wnl; due after 10/25.   - Pap smear (age 21-65): 05/2024, wnl; follows with GYN at [facility]  - Colonoscopy (age 45-75): 05/2018; rec repeat in 10 yrs per emr; however had another one in 2022 d/t bleeding and was told from hemorrhoid.   - Dexa: declined bone scan; rec taking Ca & vit daily   - Lipid profile: wasn't able to do bld work as ordered, planning to do soon      Primary prevention:  - Flu shot: Off season   - COVID vaccines: rec getting boost   - Tdap shot: UTD   - Shingles shot (age >50): rec'd 1st dose; overdue for 2nd dose.    - Prevnar 20: n/a      Counseling:   - ETOH (age>18): 3 drinks/wine per wk   - Smoking: none    Patient Care Team:  Nayan Jones MD as PCP - General (Family Medicine)  Nayan Jones MD as PCP - [employer] PCP     Review of Systems   Constitutional:  Negative for chills, fatigue, fever and unexpected weight change.   HENT: Negative.     Respiratory:  Negative for cough, shortness of breath and wheezing.    Cardiovascular:  Negative for chest pain, palpitations  "and leg swelling.   Gastrointestinal:  Negative for abdominal pain, constipation, diarrhea, nausea and vomiting.   Musculoskeletal: Negative.    Skin:  Negative for color change and rash.   Neurological:  Negative for dizziness and headaches.   Psychiatric/Behavioral:  Negative for behavioral problems and confusion.        Objective   Vitals:  /70 (BP Location: Right arm, Patient Position: Sitting, BP Cuff Size: Adult)   Pulse 99   Temp 36.3 °C (97.3 °F) (Temporal)   Resp 15   Ht 1.651 m (5' 5\")   Wt 49 kg (108 lb)   SpO2 98%   BMI 17.97 kg/m²       Physical Exam  Vitals and nursing note reviewed.   Constitutional:       Appearance: Normal appearance.   HENT:      Right Ear: Tympanic membrane normal.      Left Ear: Tympanic membrane normal.   Eyes:      Extraocular Movements: Extraocular movements intact.   Cardiovascular:      Rate and Rhythm: Normal rate and regular rhythm.      Pulses: Normal pulses.      Heart sounds: Normal heart sounds.   Pulmonary:      Effort: Pulmonary effort is normal.      Breath sounds: Normal breath sounds.   Abdominal:      General: Abdomen is flat. Bowel sounds are normal.      Palpations: Abdomen is soft.   Musculoskeletal:         General: Normal range of motion.   Neurological:      General: No focal deficit present.      Mental Status: She is alert and oriented to person, place, and time.      Cranial Nerves: No cranial nerve deficit.      Sensory: No sensory deficit.      Motor: No weakness.   Psychiatric:         Mood and Affect: Mood normal.         Behavior: Behavior normal.       Assessment & Plan  Routine general medical examination at health care facility  # [facility] wellness # HM visit   - Discussed ACP with pt; will work on POA/living will paper work   - Immunization per emr: rec to get 2nd dose of shingles vaccine   - Age appropriate screenings as listed above in [facility] summary: UTD on most   - refer [facility] summary above for detail  - BW ordered as listed in emr  "   Orders:    1 Year Follow Up In Primary Care - Wellness Exam; Future    Follow Up In Primary Care - Medicare Annual; Future    Encounter for screening mammogram for breast cancer    Orders:    BI mammo bilateral screening tomosynthesis; Future    ACP (advance care planning)  Discussed in detail about DNR forms; pt agreed to proceed with DNR comfort care-arrest; pt signed the form and I signed form as scanned; signed copy scanned into emr and provided an original copy to patient.          Advance Directives Discussion  16 - 20 minutes were spent discussing Advanced Care Planning (including a Living Will, Medical Power Of , as well as specific end of life choices and/or directives). The details of that discussion were documented in Advanced Directives Discussion section of the medical record.      RTC 6-12 mo for MCR/FU     This note was partially generated using the Dragon Voice recognition system. There may be some incorrect wording, spelling and/or spelling errors or punctuation errors that were not corrected prior to committing the note to the medical record.      Nayan Jones MD    [facility], Family Medicine

## 2025-04-23 ENCOUNTER — TELEPHONE (OUTPATIENT)
Dept: BEHAVIORAL HEALTH | Facility: CLINIC | Age: 55
End: 2025-04-23
Payer: MEDICARE

## 2025-04-24 DIAGNOSIS — F33.3 SEVERE EPISODE OF RECURRENT MAJOR DEPRESSIVE DISORDER, WITH PSYCHOTIC FEATURES (MULTI): ICD-10-CM

## 2025-04-24 DIAGNOSIS — F41.1 GENERALIZED ANXIETY DISORDER: ICD-10-CM

## 2025-04-24 RX ORDER — RISPERIDONE 0.25 MG/1
0.25 TABLET ORAL 2 TIMES DAILY
Qty: 60 TABLET | Refills: 1 | Status: SHIPPED | OUTPATIENT
Start: 2025-04-24 | End: 2025-06-23

## 2025-04-24 RX ORDER — CLONAZEPAM 1 MG/1
1 TABLET ORAL 3 TIMES DAILY PRN
Qty: 90 TABLET | Refills: 2 | Status: SHIPPED | OUTPATIENT
Start: 2025-04-24 | End: 2025-07-23

## 2025-04-29 ENCOUNTER — APPOINTMENT (OUTPATIENT)
Dept: INTEGRATIVE MEDICINE | Facility: CLINIC | Age: 55
End: 2025-04-29
Payer: MEDICARE

## 2025-05-02 DIAGNOSIS — F33.3 SEVERE EPISODE OF RECURRENT MAJOR DEPRESSIVE DISORDER, WITH PSYCHOTIC FEATURES (MULTI): ICD-10-CM

## 2025-05-02 RX ORDER — QUETIAPINE FUMARATE 100 MG/1
50-200 TABLET, FILM COATED ORAL NIGHTLY
Qty: 60 TABLET | Refills: 1 | Status: SHIPPED | OUTPATIENT
Start: 2025-05-02 | End: 2025-07-01

## 2025-05-18 DIAGNOSIS — F33.3 SEVERE EPISODE OF RECURRENT MAJOR DEPRESSIVE DISORDER, WITH PSYCHOTIC FEATURES (MULTI): ICD-10-CM

## 2025-05-18 RX ORDER — QUETIAPINE FUMARATE 100 MG/1
50-200 TABLET, FILM COATED ORAL NIGHTLY
Qty: 60 TABLET | Refills: 1 | Status: SHIPPED | OUTPATIENT
Start: 2025-05-18 | End: 2025-07-17

## 2025-05-21 DIAGNOSIS — F90.9 ATTENTION DEFICIT HYPERACTIVITY DISORDER (ADHD), UNSPECIFIED ADHD TYPE: ICD-10-CM

## 2025-05-21 RX ORDER — METHYLPHENIDATE HYDROCHLORIDE 5 MG/1
5 TABLET ORAL 2 TIMES DAILY
Qty: 28 TABLET | Refills: 0 | Status: SHIPPED | OUTPATIENT
Start: 2025-05-21 | End: 2025-06-04

## 2025-05-23 ENCOUNTER — TELEPHONE (OUTPATIENT)
Dept: BEHAVIORAL HEALTH | Facility: CLINIC | Age: 55
End: 2025-05-23
Payer: MEDICARE

## 2025-06-10 ENCOUNTER — TELEPHONE (OUTPATIENT)
Dept: BEHAVIORAL HEALTH | Facility: CLINIC | Age: 55
End: 2025-06-10
Payer: MEDICARE

## 2025-06-10 NOTE — PROGRESS NOTES
[facility] pharmacist Amanda calling in. She states that she just received a refill request to fill methylphenidate (Ritalin) 5 mg tablet . She states that pt is also taking clonazePAM (KlonoPIN) 1 mg tablet and would like to know if pt should be taking both. Please advise

## 2025-07-08 DIAGNOSIS — F41.1 GENERALIZED ANXIETY DISORDER: ICD-10-CM

## 2025-07-08 DIAGNOSIS — F33.3 SEVERE EPISODE OF RECURRENT MAJOR DEPRESSIVE DISORDER, WITH PSYCHOTIC FEATURES (MULTI): ICD-10-CM

## 2025-07-08 RX ORDER — QUETIAPINE FUMARATE 300 MG/1
300 TABLET, FILM COATED ORAL NIGHTLY
Qty: 30 TABLET | Refills: 1 | Status: SHIPPED | OUTPATIENT
Start: 2025-07-08 | End: 2025-09-06

## 2025-07-08 RX ORDER — GABAPENTIN 300 MG/1
300 CAPSULE ORAL 3 TIMES DAILY
Qty: 90 CAPSULE | Refills: 2 | Status: SHIPPED | OUTPATIENT
Start: 2025-07-08 | End: 2025-10-06

## 2025-07-10 LAB
BASOPHILS # BLD AUTO: 51 CELLS/UL (ref 0–200)
BASOPHILS NFR BLD AUTO: 1 %
CHOLEST SERPL-MCNC: 222 MG/DL
CHOLEST/HDLC SERPL: 3.3 (CALC)
EOSINOPHIL # BLD AUTO: 189 CELLS/UL (ref 15–500)
EOSINOPHIL NFR BLD AUTO: 3.7 %
ERYTHROCYTE [DISTWIDTH] IN BLOOD BY AUTOMATED COUNT: 12.9 % (ref 11–15)
HCT VFR BLD AUTO: 39 % (ref 35–45)
HDLC SERPL-MCNC: 67 MG/DL
HGB BLD-MCNC: 13 G/DL (ref 11.7–15.5)
LDLC SERPL CALC-MCNC: 133 MG/DL (CALC)
LYMPHOCYTES # BLD AUTO: 1556 CELLS/UL (ref 850–3900)
LYMPHOCYTES NFR BLD AUTO: 30.5 %
MCH RBC QN AUTO: 31.3 PG (ref 27–33)
MCHC RBC AUTO-ENTMCNC: 33.3 G/DL (ref 32–36)
MCV RBC AUTO: 93.8 FL (ref 80–100)
MONOCYTES # BLD AUTO: 474 CELLS/UL (ref 200–950)
MONOCYTES NFR BLD AUTO: 9.3 %
NEUTROPHILS # BLD AUTO: 2831 CELLS/UL (ref 1500–7800)
NEUTROPHILS NFR BLD AUTO: 55.5 %
NONHDLC SERPL-MCNC: 155 MG/DL (CALC)
PLATELET # BLD AUTO: 370 THOUSAND/UL (ref 140–400)
PMV BLD REES-ECKER: 9.9 FL (ref 7.5–12.5)
RBC # BLD AUTO: 4.16 MILLION/UL (ref 3.8–5.1)
TRIGL SERPL-MCNC: 110 MG/DL
WBC # BLD AUTO: 5.1 THOUSAND/UL (ref 3.8–10.8)

## 2025-07-15 DIAGNOSIS — F41.1 GENERALIZED ANXIETY DISORDER: ICD-10-CM

## 2025-07-15 DIAGNOSIS — F33.3 SEVERE EPISODE OF RECURRENT MAJOR DEPRESSIVE DISORDER, WITH PSYCHOTIC FEATURES (MULTI): ICD-10-CM

## 2025-07-15 RX ORDER — LITHIUM CARBONATE 300 MG/1
300 CAPSULE ORAL 2 TIMES DAILY
Qty: 60 CAPSULE | Refills: 2 | Status: SHIPPED | OUTPATIENT
Start: 2025-07-15 | End: 2025-10-13

## 2025-07-15 RX ORDER — DIAZEPAM 5 MG/1
5 TABLET ORAL EVERY 8 HOURS PRN
Qty: 42 TABLET | Refills: 0 | Status: SHIPPED | OUTPATIENT
Start: 2025-07-15 | End: 2025-07-29
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
## 2025-07-29 DIAGNOSIS — F33.3 SEVERE EPISODE OF RECURRENT MAJOR DEPRESSIVE DISORDER, WITH PSYCHOTIC FEATURES (MULTI): ICD-10-CM

## 2025-07-29 RX ORDER — QUETIAPINE FUMARATE 100 MG/1
50-200 TABLET, FILM COATED ORAL NIGHTLY
Qty: 60 TABLET | Refills: 1 | OUTPATIENT
Start: 2025-07-29

## 2026-04-28 ENCOUNTER — APPOINTMENT (OUTPATIENT)
Dept: PRIMARY CARE | Facility: CLINIC | Age: 56
End: 2026-04-28
Payer: MEDICARE